# Patient Record
Sex: FEMALE | Race: OTHER | HISPANIC OR LATINO
[De-identification: names, ages, dates, MRNs, and addresses within clinical notes are randomized per-mention and may not be internally consistent; named-entity substitution may affect disease eponyms.]

---

## 2017-01-13 ENCOUNTER — NON-APPOINTMENT (OUTPATIENT)
Age: 82
End: 2017-01-13

## 2017-01-13 ENCOUNTER — APPOINTMENT (OUTPATIENT)
Dept: CARDIOLOGY | Facility: CLINIC | Age: 82
End: 2017-01-13

## 2017-01-13 VITALS
HEART RATE: 89 BPM | OXYGEN SATURATION: 98 % | WEIGHT: 164 LBS | BODY MASS INDEX: 29.06 KG/M2 | HEIGHT: 63 IN | SYSTOLIC BLOOD PRESSURE: 143 MMHG | DIASTOLIC BLOOD PRESSURE: 84 MMHG

## 2017-01-13 DIAGNOSIS — Z86.79 PERSONAL HISTORY OF OTHER DISEASES OF THE CIRCULATORY SYSTEM: ICD-10-CM

## 2017-01-18 PROBLEM — Z86.79 HISTORY OF MITRAL VALVE INSUFFICIENCY: Status: RESOLVED | Noted: 2017-01-18 | Resolved: 2017-01-18

## 2017-02-06 ENCOUNTER — APPOINTMENT (OUTPATIENT)
Dept: ELECTROPHYSIOLOGY | Facility: CLINIC | Age: 82
End: 2017-02-06

## 2017-04-05 ENCOUNTER — APPOINTMENT (OUTPATIENT)
Dept: ELECTROPHYSIOLOGY | Facility: CLINIC | Age: 82
End: 2017-04-05

## 2017-04-28 ENCOUNTER — NON-APPOINTMENT (OUTPATIENT)
Age: 82
End: 2017-04-28

## 2017-04-28 ENCOUNTER — APPOINTMENT (OUTPATIENT)
Dept: CARDIOLOGY | Facility: CLINIC | Age: 82
End: 2017-04-28

## 2017-04-28 VITALS
WEIGHT: 170 LBS | DIASTOLIC BLOOD PRESSURE: 93 MMHG | HEIGHT: 63 IN | OXYGEN SATURATION: 93 % | HEART RATE: 87 BPM | SYSTOLIC BLOOD PRESSURE: 154 MMHG | BODY MASS INDEX: 30.12 KG/M2

## 2017-04-28 DIAGNOSIS — I73.9 PERIPHERAL VASCULAR DISEASE, UNSPECIFIED: ICD-10-CM

## 2017-04-28 DIAGNOSIS — R07.9 CHEST PAIN, UNSPECIFIED: ICD-10-CM

## 2017-05-04 PROBLEM — R07.9 CHEST PAIN: Status: ACTIVE | Noted: 2017-04-28

## 2017-05-04 PROBLEM — I73.9 INTERMITTENT CLAUDICATION: Status: ACTIVE | Noted: 2017-04-28

## 2017-05-12 ENCOUNTER — OUTPATIENT (OUTPATIENT)
Dept: OUTPATIENT SERVICES | Facility: HOSPITAL | Age: 82
LOS: 1 days | End: 2017-05-12

## 2017-05-12 ENCOUNTER — APPOINTMENT (OUTPATIENT)
Dept: CV DIAGNOSTICS | Facility: HOSPITAL | Age: 82
End: 2017-05-12

## 2017-05-12 ENCOUNTER — APPOINTMENT (OUTPATIENT)
Dept: CV DIAGNOSITCS | Facility: HOSPITAL | Age: 82
End: 2017-05-12

## 2017-05-12 DIAGNOSIS — I73.9 PERIPHERAL VASCULAR DISEASE, UNSPECIFIED: ICD-10-CM

## 2017-05-12 DIAGNOSIS — R07.9 CHEST PAIN, UNSPECIFIED: ICD-10-CM

## 2017-07-21 ENCOUNTER — APPOINTMENT (OUTPATIENT)
Dept: ELECTROPHYSIOLOGY | Facility: CLINIC | Age: 82
End: 2017-07-21

## 2017-10-24 ENCOUNTER — APPOINTMENT (OUTPATIENT)
Dept: ELECTROPHYSIOLOGY | Facility: CLINIC | Age: 82
End: 2017-10-24
Payer: MEDICARE

## 2017-10-24 PROCEDURE — 93296 REM INTERROG EVL PM/IDS: CPT

## 2017-10-24 PROCEDURE — 93294 REM INTERROG EVL PM/LDLS PM: CPT

## 2018-02-14 ENCOUNTER — APPOINTMENT (OUTPATIENT)
Dept: ELECTROPHYSIOLOGY | Facility: CLINIC | Age: 83
End: 2018-02-14
Payer: MEDICARE

## 2018-02-14 PROCEDURE — 93279 PRGRMG DEV EVAL PM/LDLS PM: CPT

## 2018-02-14 RX ORDER — ALENDRONATE SODIUM 70 MG/1
70 TABLET ORAL
Refills: 0 | Status: ACTIVE | COMMUNITY

## 2018-05-08 ENCOUNTER — APPOINTMENT (OUTPATIENT)
Dept: ELECTROPHYSIOLOGY | Facility: CLINIC | Age: 83
End: 2018-05-08

## 2018-08-17 ENCOUNTER — APPOINTMENT (OUTPATIENT)
Dept: ELECTROPHYSIOLOGY | Facility: CLINIC | Age: 83
End: 2018-08-17
Payer: MEDICARE

## 2018-08-17 ENCOUNTER — NON-APPOINTMENT (OUTPATIENT)
Age: 83
End: 2018-08-17

## 2018-08-17 ENCOUNTER — APPOINTMENT (OUTPATIENT)
Dept: CARDIOLOGY | Facility: CLINIC | Age: 83
End: 2018-08-17
Payer: MEDICARE

## 2018-08-17 VITALS
RESPIRATION RATE: 14 BRPM | DIASTOLIC BLOOD PRESSURE: 90 MMHG | DIASTOLIC BLOOD PRESSURE: 85 MMHG | HEIGHT: 63 IN | RESPIRATION RATE: 16 BRPM | OXYGEN SATURATION: 96 % | BODY MASS INDEX: 30.12 KG/M2 | WEIGHT: 170 LBS | HEART RATE: 72 BPM | HEART RATE: 70 BPM | SYSTOLIC BLOOD PRESSURE: 145 MMHG | SYSTOLIC BLOOD PRESSURE: 147 MMHG

## 2018-08-17 PROCEDURE — 93280 PM DEVICE PROGR EVAL DUAL: CPT

## 2018-08-17 PROCEDURE — 99215 OFFICE O/P EST HI 40 MIN: CPT

## 2018-08-17 PROCEDURE — 93000 ELECTROCARDIOGRAM COMPLETE: CPT

## 2018-08-18 RX ORDER — CHLORHEXIDINE GLUCONATE 4 %
1000 LIQUID (ML) TOPICAL DAILY
Refills: 0 | Status: ACTIVE | COMMUNITY

## 2018-11-19 ENCOUNTER — APPOINTMENT (OUTPATIENT)
Dept: ELECTROPHYSIOLOGY | Facility: CLINIC | Age: 83
End: 2018-11-19

## 2019-02-15 ENCOUNTER — APPOINTMENT (OUTPATIENT)
Dept: CARDIOLOGY | Facility: CLINIC | Age: 84
End: 2019-02-15
Payer: MEDICARE

## 2019-02-15 ENCOUNTER — NON-APPOINTMENT (OUTPATIENT)
Age: 84
End: 2019-02-15

## 2019-02-15 VITALS
OXYGEN SATURATION: 96 % | BODY MASS INDEX: 29.77 KG/M2 | WEIGHT: 168 LBS | HEART RATE: 80 BPM | SYSTOLIC BLOOD PRESSURE: 151 MMHG | RESPIRATION RATE: 14 BRPM | DIASTOLIC BLOOD PRESSURE: 95 MMHG | HEIGHT: 63 IN

## 2019-02-15 PROCEDURE — 93000 ELECTROCARDIOGRAM COMPLETE: CPT

## 2019-02-15 PROCEDURE — 99214 OFFICE O/P EST MOD 30 MIN: CPT
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
## 2019-02-18 NOTE — DISCUSSION/SUMMARY
[FreeTextEntry1] : In summary, Ms. Melo appears to be doing reasonably well from a cardiac standpoint.  She is able to ambulate slowly and has not had any symptoms suggestive of angina or congestive heart failure.  In addition, Ms. Melo's most recent permanent pacemaker interrogation demonstrated normal device function.  However, I noted that Ms. Melo's blood pressure was again elevated at today's office visit.  Therefore, I suggested to her that we increase her amlodipine dose from 5 mg/day to 10 mg/day.  Ms. Melo is in agreement with this plan and will make the medication dosage change.  She will follow up with me in approximately three months, at which time we will obtain a follow up transthoracic echocardiogram.

## 2019-02-22 ENCOUNTER — APPOINTMENT (OUTPATIENT)
Dept: ELECTROPHYSIOLOGY | Facility: CLINIC | Age: 84
End: 2019-02-22

## 2019-05-17 ENCOUNTER — OUTPATIENT (OUTPATIENT)
Dept: OUTPATIENT SERVICES | Facility: HOSPITAL | Age: 84
LOS: 1 days | End: 2019-05-17

## 2019-05-17 ENCOUNTER — APPOINTMENT (OUTPATIENT)
Dept: CARDIOLOGY | Facility: CLINIC | Age: 84
End: 2019-05-17
Payer: MEDICARE

## 2019-05-17 ENCOUNTER — NON-APPOINTMENT (OUTPATIENT)
Age: 84
End: 2019-05-17

## 2019-05-17 ENCOUNTER — APPOINTMENT (OUTPATIENT)
Dept: CV DIAGNOSITCS | Facility: HOSPITAL | Age: 84
End: 2019-05-17
Payer: MEDICARE

## 2019-05-17 VITALS
WEIGHT: 155 LBS | OXYGEN SATURATION: 98 % | HEIGHT: 63 IN | HEART RATE: 70 BPM | SYSTOLIC BLOOD PRESSURE: 138 MMHG | BODY MASS INDEX: 27.46 KG/M2 | DIASTOLIC BLOOD PRESSURE: 80 MMHG

## 2019-05-17 DIAGNOSIS — I34.0 NONRHEUMATIC MITRAL (VALVE) INSUFFICIENCY: ICD-10-CM

## 2019-05-17 PROCEDURE — 93306 TTE W/DOPPLER COMPLETE: CPT | Mod: 26

## 2019-05-17 PROCEDURE — 99215 OFFICE O/P EST HI 40 MIN: CPT

## 2019-05-17 PROCEDURE — 93000 ELECTROCARDIOGRAM COMPLETE: CPT

## 2019-05-21 NOTE — DISCUSSION/SUMMARY
[FreeTextEntry1] : Of note is that Ms. Melo had a follow up transthoracic echocardiogram performed at the time of today's office visit.  In summary, this demonstrated normal LV and RV systolic function, with no regional wall motion abnormalities (LVEF 58%).  There was moderate mitral regurgitation.  The left atrium was found to be severely dilated.  There was evidence of moderate pulmonary hypertension, with an estimated pulmonary artery systolic pressure of 53 mmHg.  In summary, there were no significant changes compared to Ms. Melo's previous study of 11/4/2016.\par \par In summary, I am pleased with how Ms. Melo is doing from a cardiac standpoint.  She has had no recent symptoms suggestive of angina or congestive heart failure.  In addition, her blood pressure is under good control on her current medical regimen.  I discussed the findings of today's echocardiogram with Ms. Melo and indicated to her that the results were similar to those of her most recent echocardiogram.  In particular, there has been no progression in the degree of mitral regurgitation, and the LV systolic function remains normal.  I encouraged Ms. Melo to continue to ambulate as tolerated.  I also advised her to continue with her current medical regimen.  She will follow up with me in approximately three months.
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
## 2019-07-19 ENCOUNTER — APPOINTMENT (OUTPATIENT)
Dept: CARDIOLOGY | Facility: CLINIC | Age: 84
End: 2019-07-19
Payer: MEDICARE

## 2019-07-19 ENCOUNTER — NON-APPOINTMENT (OUTPATIENT)
Age: 84
End: 2019-07-19

## 2019-07-19 ENCOUNTER — INPATIENT (INPATIENT)
Facility: HOSPITAL | Age: 84
LOS: 2 days | Discharge: HOME | End: 2019-07-22
Attending: HOSPITALIST | Admitting: HOSPITALIST
Payer: MEDICARE

## 2019-07-19 VITALS
TEMPERATURE: 100 F | RESPIRATION RATE: 20 BRPM | DIASTOLIC BLOOD PRESSURE: 71 MMHG | HEART RATE: 72 BPM | SYSTOLIC BLOOD PRESSURE: 140 MMHG | OXYGEN SATURATION: 98 %

## 2019-07-19 VITALS
RESPIRATION RATE: 18 BRPM | DIASTOLIC BLOOD PRESSURE: 88 MMHG | SYSTOLIC BLOOD PRESSURE: 156 MMHG | OXYGEN SATURATION: 98 % | HEART RATE: 80 BPM | WEIGHT: 155 LBS | HEIGHT: 63 IN | BODY MASS INDEX: 27.46 KG/M2

## 2019-07-19 DIAGNOSIS — I48.91 UNSPECIFIED ATRIAL FIBRILLATION: ICD-10-CM

## 2019-07-19 DIAGNOSIS — B34.8 OTHER VIRAL INFECTIONS OF UNSPECIFIED SITE: ICD-10-CM

## 2019-07-19 DIAGNOSIS — Z00.8 ENCOUNTER FOR OTHER GENERAL EXAMINATION: ICD-10-CM

## 2019-07-19 DIAGNOSIS — E11.9 TYPE 2 DIABETES MELLITUS WITHOUT COMPLICATIONS: ICD-10-CM

## 2019-07-19 DIAGNOSIS — I25.10 ATHEROSCLEROTIC HEART DISEASE OF NATIVE CORONARY ARTERY W/OUT ANGINA PECTORIS: ICD-10-CM

## 2019-07-19 DIAGNOSIS — I44.2 ATRIOVENTRICULAR BLOCK, COMPLETE: ICD-10-CM

## 2019-07-19 DIAGNOSIS — I47.2 VENTRICULAR TACHYCARDIA: ICD-10-CM

## 2019-07-19 DIAGNOSIS — I10 ESSENTIAL (PRIMARY) HYPERTENSION: ICD-10-CM

## 2019-07-19 DIAGNOSIS — E78.5 HYPERLIPIDEMIA, UNSPECIFIED: ICD-10-CM

## 2019-07-19 DIAGNOSIS — J45.909 UNSPECIFIED ASTHMA, UNCOMPLICATED: ICD-10-CM

## 2019-07-19 DIAGNOSIS — Z29.9 ENCOUNTER FOR PROPHYLACTIC MEASURES, UNSPECIFIED: ICD-10-CM

## 2019-07-19 DIAGNOSIS — J20.8 ACUTE BRONCHITIS DUE TO OTHER SPECIFIED ORGANISMS: ICD-10-CM

## 2019-07-19 DIAGNOSIS — J18.9 PNEUMONIA, UNSPECIFIED ORGANISM: ICD-10-CM

## 2019-07-19 DIAGNOSIS — A41.9 SEPSIS, UNSPECIFIED ORGANISM: ICD-10-CM

## 2019-07-19 DIAGNOSIS — I34.0 NONRHEUMATIC MITRAL (VALVE) INSUFFICIENCY: ICD-10-CM

## 2019-07-19 LAB
ALBUMIN SERPL ELPH-MCNC: 4.3 G/DL — SIGNIFICANT CHANGE UP (ref 3.3–5)
ALP SERPL-CCNC: 51 U/L — SIGNIFICANT CHANGE UP (ref 40–120)
ALT FLD-CCNC: 16 U/L — SIGNIFICANT CHANGE UP (ref 4–33)
ANION GAP SERPL CALC-SCNC: 13 MMO/L — SIGNIFICANT CHANGE UP (ref 7–14)
APTT BLD: 23.7 SEC — LOW (ref 27.5–36.3)
AST SERPL-CCNC: 28 U/L — SIGNIFICANT CHANGE UP (ref 4–32)
B PERT DNA SPEC QL NAA+PROBE: NOT DETECTED — SIGNIFICANT CHANGE UP
BASE EXCESS BLDV CALC-SCNC: 4.2 MMOL/L — SIGNIFICANT CHANGE UP
BASOPHILS # BLD AUTO: 0.02 K/UL — SIGNIFICANT CHANGE UP (ref 0–0.2)
BASOPHILS NFR BLD AUTO: 0.6 % — SIGNIFICANT CHANGE UP (ref 0–2)
BILIRUB SERPL-MCNC: 2.3 MG/DL — HIGH (ref 0.2–1.2)
BLOOD GAS VENOUS - CREATININE: 0.77 MG/DL — SIGNIFICANT CHANGE UP (ref 0.5–1.3)
BUN SERPL-MCNC: 15 MG/DL — SIGNIFICANT CHANGE UP (ref 7–23)
C PNEUM DNA SPEC QL NAA+PROBE: NOT DETECTED — SIGNIFICANT CHANGE UP
CALCIUM SERPL-MCNC: 10.1 MG/DL — SIGNIFICANT CHANGE UP (ref 8.4–10.5)
CHLORIDE BLDV-SCNC: 101 MMOL/L — SIGNIFICANT CHANGE UP (ref 96–108)
CHLORIDE SERPL-SCNC: 98 MMOL/L — SIGNIFICANT CHANGE UP (ref 98–107)
CO2 SERPL-SCNC: 27 MMOL/L — SIGNIFICANT CHANGE UP (ref 22–31)
CREAT SERPL-MCNC: 0.75 MG/DL — SIGNIFICANT CHANGE UP (ref 0.5–1.3)
EOSINOPHIL # BLD AUTO: 0.03 K/UL — SIGNIFICANT CHANGE UP (ref 0–0.5)
EOSINOPHIL NFR BLD AUTO: 0.9 % — SIGNIFICANT CHANGE UP (ref 0–6)
FLUAV H1 2009 PAND RNA SPEC QL NAA+PROBE: NOT DETECTED — SIGNIFICANT CHANGE UP
FLUAV H1 RNA SPEC QL NAA+PROBE: NOT DETECTED — SIGNIFICANT CHANGE UP
FLUAV H3 RNA SPEC QL NAA+PROBE: NOT DETECTED — SIGNIFICANT CHANGE UP
FLUAV SUBTYP SPEC NAA+PROBE: NOT DETECTED — SIGNIFICANT CHANGE UP
FLUBV RNA SPEC QL NAA+PROBE: NOT DETECTED — SIGNIFICANT CHANGE UP
GAS PNL BLDV: 136 MMOL/L — SIGNIFICANT CHANGE UP (ref 136–146)
GLUCOSE BLDV-MCNC: 104 MG/DL — HIGH (ref 70–99)
GLUCOSE SERPL-MCNC: 105 MG/DL — HIGH (ref 70–99)
HADV DNA SPEC QL NAA+PROBE: NOT DETECTED — SIGNIFICANT CHANGE UP
HCO3 BLDV-SCNC: 26 MMOL/L — SIGNIFICANT CHANGE UP (ref 20–27)
HCOV PNL SPEC NAA+PROBE: SIGNIFICANT CHANGE UP
HCT VFR BLD CALC: 45.7 % — HIGH (ref 34.5–45)
HCT VFR BLDV CALC: 42.8 % — SIGNIFICANT CHANGE UP (ref 34.5–45)
HGB BLD-MCNC: 14.7 G/DL — SIGNIFICANT CHANGE UP (ref 11.5–15.5)
HGB BLDV-MCNC: 13.9 G/DL — SIGNIFICANT CHANGE UP (ref 11.5–15.5)
HMPV RNA SPEC QL NAA+PROBE: NOT DETECTED — SIGNIFICANT CHANGE UP
HPIV1 RNA SPEC QL NAA+PROBE: NOT DETECTED — SIGNIFICANT CHANGE UP
HPIV2 RNA SPEC QL NAA+PROBE: NOT DETECTED — SIGNIFICANT CHANGE UP
HPIV3 RNA SPEC QL NAA+PROBE: DETECTED — HIGH
HPIV4 RNA SPEC QL NAA+PROBE: NOT DETECTED — SIGNIFICANT CHANGE UP
IMM GRANULOCYTES NFR BLD AUTO: 0.6 % — SIGNIFICANT CHANGE UP (ref 0–1.5)
INR BLD: 1.16 — SIGNIFICANT CHANGE UP (ref 0.88–1.17)
LACTATE BLDV-MCNC: 1.7 MMOL/L — SIGNIFICANT CHANGE UP (ref 0.5–2)
LYMPHOCYTES # BLD AUTO: 0.77 K/UL — LOW (ref 1–3.3)
LYMPHOCYTES # BLD AUTO: 22.8 % — SIGNIFICANT CHANGE UP (ref 13–44)
MAGNESIUM SERPL-MCNC: 1.8 MG/DL — SIGNIFICANT CHANGE UP (ref 1.6–2.6)
MCHC RBC-ENTMCNC: 28.3 PG — SIGNIFICANT CHANGE UP (ref 27–34)
MCHC RBC-ENTMCNC: 32.2 % — SIGNIFICANT CHANGE UP (ref 32–36)
MCV RBC AUTO: 88.1 FL — SIGNIFICANT CHANGE UP (ref 80–100)
MONOCYTES # BLD AUTO: 0.51 K/UL — SIGNIFICANT CHANGE UP (ref 0–0.9)
MONOCYTES NFR BLD AUTO: 15.1 % — HIGH (ref 2–14)
NEUTROPHILS # BLD AUTO: 2.02 K/UL — SIGNIFICANT CHANGE UP (ref 1.8–7.4)
NEUTROPHILS NFR BLD AUTO: 60 % — SIGNIFICANT CHANGE UP (ref 43–77)
NRBC # FLD: 0 K/UL — SIGNIFICANT CHANGE UP (ref 0–0)
PCO2 BLDV: 51 MMHG — SIGNIFICANT CHANGE UP (ref 41–51)
PH BLDV: 7.38 PH — SIGNIFICANT CHANGE UP (ref 7.32–7.43)
PHOSPHATE SERPL-MCNC: 2.9 MG/DL — SIGNIFICANT CHANGE UP (ref 2.5–4.5)
PLATELET # BLD AUTO: 110 K/UL — LOW (ref 150–400)
PMV BLD: 11.7 FL — SIGNIFICANT CHANGE UP (ref 7–13)
PO2 BLDV: 18 MMHG — LOW (ref 35–40)
POTASSIUM BLDV-SCNC: 4.8 MMOL/L — HIGH (ref 3.4–4.5)
POTASSIUM SERPL-MCNC: 4 MMOL/L — SIGNIFICANT CHANGE UP (ref 3.5–5.3)
POTASSIUM SERPL-SCNC: 4 MMOL/L — SIGNIFICANT CHANGE UP (ref 3.5–5.3)
PROT SERPL-MCNC: 7.9 G/DL — SIGNIFICANT CHANGE UP (ref 6–8.3)
PROTHROM AB SERPL-ACNC: 13.3 SEC — HIGH (ref 9.8–13.1)
RBC # BLD: 5.19 M/UL — SIGNIFICANT CHANGE UP (ref 3.8–5.2)
RBC # FLD: 14.5 % — SIGNIFICANT CHANGE UP (ref 10.3–14.5)
RSV RNA SPEC QL NAA+PROBE: NOT DETECTED — SIGNIFICANT CHANGE UP
RV+EV RNA SPEC QL NAA+PROBE: NOT DETECTED — SIGNIFICANT CHANGE UP
SAO2 % BLDV: 22.2 % — LOW (ref 60–85)
SODIUM SERPL-SCNC: 138 MMOL/L — SIGNIFICANT CHANGE UP (ref 135–145)
WBC # BLD: 3.37 K/UL — LOW (ref 3.8–10.5)
WBC # FLD AUTO: 3.37 K/UL — LOW (ref 3.8–10.5)

## 2019-07-19 PROCEDURE — 71046 X-RAY EXAM CHEST 2 VIEWS: CPT | Mod: 26

## 2019-07-19 PROCEDURE — 99215 OFFICE O/P EST HI 40 MIN: CPT

## 2019-07-19 PROCEDURE — 99223 1ST HOSP IP/OBS HIGH 75: CPT | Mod: GC

## 2019-07-19 PROCEDURE — 93000 ELECTROCARDIOGRAM COMPLETE: CPT

## 2019-07-19 RX ORDER — ACETAMINOPHEN 500 MG
975 TABLET ORAL ONCE
Refills: 0 | Status: COMPLETED | OUTPATIENT
Start: 2019-07-19 | End: 2019-07-19

## 2019-07-19 RX ORDER — ENOXAPARIN SODIUM 100 MG/ML
40 INJECTION SUBCUTANEOUS DAILY
Refills: 0 | Status: DISCONTINUED | OUTPATIENT
Start: 2019-07-19 | End: 2019-07-20

## 2019-07-19 RX ORDER — WARFARIN SODIUM 2.5 MG/1
5 TABLET ORAL DAILY
Refills: 0 | Status: COMPLETED | OUTPATIENT
Start: 2019-07-19 | End: 2019-07-22

## 2019-07-19 RX ORDER — FUROSEMIDE 40 MG
40 TABLET ORAL DAILY
Refills: 0 | Status: DISCONTINUED | OUTPATIENT
Start: 2019-07-20 | End: 2019-07-22

## 2019-07-19 RX ORDER — LOSARTAN POTASSIUM 100 MG/1
25 TABLET, FILM COATED ORAL DAILY
Refills: 0 | Status: DISCONTINUED | OUTPATIENT
Start: 2019-07-20 | End: 2019-07-22

## 2019-07-19 RX ORDER — IPRATROPIUM/ALBUTEROL SULFATE 18-103MCG
3 AEROSOL WITH ADAPTER (GRAM) INHALATION ONCE
Refills: 0 | Status: COMPLETED | OUTPATIENT
Start: 2019-07-19 | End: 2019-07-19

## 2019-07-19 RX ORDER — METOPROLOL TARTRATE 50 MG
50 TABLET ORAL
Refills: 0 | Status: DISCONTINUED | OUTPATIENT
Start: 2019-07-20 | End: 2019-07-22

## 2019-07-19 RX ORDER — WARFARIN SODIUM 2.5 MG/1
6 TABLET ORAL DAILY
Refills: 0 | Status: DISCONTINUED | OUTPATIENT
Start: 2019-07-19 | End: 2019-07-19

## 2019-07-19 RX ORDER — DEXTROSE 50 % IN WATER 50 %
15 SYRINGE (ML) INTRAVENOUS ONCE
Refills: 0 | Status: DISCONTINUED | OUTPATIENT
Start: 2019-07-19 | End: 2019-07-22

## 2019-07-19 RX ORDER — ENOXAPARIN SODIUM 100 MG/ML
40 INJECTION SUBCUTANEOUS DAILY
Refills: 0 | Status: DISCONTINUED | OUTPATIENT
Start: 2019-07-19 | End: 2019-07-19

## 2019-07-19 RX ORDER — SODIUM CHLORIDE 9 MG/ML
1000 INJECTION, SOLUTION INTRAVENOUS ONCE
Refills: 0 | Status: COMPLETED | OUTPATIENT
Start: 2019-07-19 | End: 2019-07-19

## 2019-07-19 RX ORDER — INSULIN LISPRO 100/ML
VIAL (ML) SUBCUTANEOUS
Refills: 0 | Status: DISCONTINUED | OUTPATIENT
Start: 2019-07-19 | End: 2019-07-22

## 2019-07-19 RX ORDER — DEXTROSE 50 % IN WATER 50 %
25 SYRINGE (ML) INTRAVENOUS ONCE
Refills: 0 | Status: DISCONTINUED | OUTPATIENT
Start: 2019-07-19 | End: 2019-07-22

## 2019-07-19 RX ORDER — PREGABALIN 225 MG/1
1000 CAPSULE ORAL DAILY
Refills: 0 | Status: DISCONTINUED | OUTPATIENT
Start: 2019-07-19 | End: 2019-07-22

## 2019-07-19 RX ORDER — ALBUTEROL 90 UG/1
1 AEROSOL, METERED ORAL EVERY 4 HOURS
Refills: 0 | Status: DISCONTINUED | OUTPATIENT
Start: 2019-07-19 | End: 2019-07-22

## 2019-07-19 RX ORDER — PREGABALIN 225 MG/1
1 CAPSULE ORAL
Qty: 0 | Refills: 0 | DISCHARGE

## 2019-07-19 RX ORDER — IPRATROPIUM/ALBUTEROL SULFATE 18-103MCG
3 AEROSOL WITH ADAPTER (GRAM) INHALATION EVERY 6 HOURS
Refills: 0 | Status: DISCONTINUED | OUTPATIENT
Start: 2019-07-19 | End: 2019-07-22

## 2019-07-19 RX ORDER — ERGOCALCIFEROL 1.25 MG/1
1 CAPSULE ORAL
Qty: 0 | Refills: 0 | DISCHARGE

## 2019-07-19 RX ORDER — SODIUM CHLORIDE 9 MG/ML
1000 INJECTION, SOLUTION INTRAVENOUS
Refills: 0 | Status: DISCONTINUED | OUTPATIENT
Start: 2019-07-19 | End: 2019-07-22

## 2019-07-19 RX ORDER — CEFTRIAXONE 500 MG/1
1000 INJECTION, POWDER, FOR SOLUTION INTRAMUSCULAR; INTRAVENOUS ONCE
Refills: 0 | Status: COMPLETED | OUTPATIENT
Start: 2019-07-19 | End: 2019-07-19

## 2019-07-19 RX ORDER — ATORVASTATIN CALCIUM 80 MG/1
40 TABLET, FILM COATED ORAL AT BEDTIME
Refills: 0 | Status: DISCONTINUED | OUTPATIENT
Start: 2019-07-19 | End: 2019-07-22

## 2019-07-19 RX ORDER — GLUCAGON INJECTION, SOLUTION 0.5 MG/.1ML
1 INJECTION, SOLUTION SUBCUTANEOUS ONCE
Refills: 0 | Status: DISCONTINUED | OUTPATIENT
Start: 2019-07-19 | End: 2019-07-22

## 2019-07-19 RX ORDER — AMLODIPINE BESYLATE 2.5 MG/1
5 TABLET ORAL DAILY
Refills: 0 | Status: DISCONTINUED | OUTPATIENT
Start: 2019-07-20 | End: 2019-07-22

## 2019-07-19 RX ORDER — ERGOCALCIFEROL 1.25 MG/1
50000 CAPSULE ORAL
Refills: 0 | Status: DISCONTINUED | OUTPATIENT
Start: 2019-07-19 | End: 2019-07-22

## 2019-07-19 RX ORDER — DEXTROSE 50 % IN WATER 50 %
12.5 SYRINGE (ML) INTRAVENOUS ONCE
Refills: 0 | Status: DISCONTINUED | OUTPATIENT
Start: 2019-07-19 | End: 2019-07-22

## 2019-07-19 RX ORDER — TIOTROPIUM BROMIDE 18 UG/1
1 CAPSULE ORAL; RESPIRATORY (INHALATION) DAILY
Refills: 0 | Status: DISCONTINUED | OUTPATIENT
Start: 2019-07-19 | End: 2019-07-22

## 2019-07-19 RX ORDER — INSULIN LISPRO 100/ML
VIAL (ML) SUBCUTANEOUS AT BEDTIME
Refills: 0 | Status: DISCONTINUED | OUTPATIENT
Start: 2019-07-19 | End: 2019-07-22

## 2019-07-19 RX ADMIN — Medication 3 MILLILITER(S): at 14:55

## 2019-07-19 RX ADMIN — Medication 975 MILLIGRAM(S): at 14:54

## 2019-07-19 RX ADMIN — ATORVASTATIN CALCIUM 40 MILLIGRAM(S): 80 TABLET, FILM COATED ORAL at 22:41

## 2019-07-19 RX ADMIN — Medication 3 MILLILITER(S): at 23:00

## 2019-07-19 RX ADMIN — CEFTRIAXONE 100 MILLIGRAM(S): 500 INJECTION, POWDER, FOR SOLUTION INTRAMUSCULAR; INTRAVENOUS at 14:54

## 2019-07-19 RX ADMIN — SODIUM CHLORIDE 1000 MILLILITER(S): 9 INJECTION, SOLUTION INTRAVENOUS at 19:57

## 2019-07-19 RX ADMIN — Medication 110 MILLIGRAM(S): at 15:41

## 2019-07-19 RX ADMIN — WARFARIN SODIUM 5 MILLIGRAM(S): 2.5 TABLET ORAL at 22:41

## 2019-07-19 NOTE — H&P ADULT - ASSESSMENT
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

## 2019-07-19 NOTE — H&P ADULT - NSHPPHYSICALEXAM_GEN_ALL_CORE
T(C): 36.9 (07-19-19 @ 17:36), Max: 38.4 (07-19-19 @ 14:06)  HR: 74 (07-19-19 @ 17:36) (72 - 94)  BP: 126/60 (07-19-19 @ 17:36) (126/60 - 183/89)  RR: 26 (07-19-19 @ 17:36) (20 - 26)  SpO2: 96% (07-19-19 @ 17:36) (95% - 98%)    PHYSICAL EXAM:  GENERAL: Lying in bed comfortably in NAD  HEAD:  Atraumatic, Normocephalic  EYES: PERRL, EOMs intact b/l,   ENMT: Moist Mucus membranes  NERVOUS SYSTEM:  A&Ox4, Normal Motor strength in b/l Upper and lower extremities, gross sensation to light tough intact in b/l upper and lower extremities, CNs II-XII intact b/l w/out focal deficits, EOMs intact, and PERRL. Patient with very unsteady gait on ambulation with a cane and also did not appear short of breath on ambulation and was still able to speak in full sentences.   CHEST/LUNG: +slight expiratory wheezing diffusely with scattered rhonchi but no focal rales appreciated and appears to be comfortable in bed and not in respiratory distress  HEART: RRR no m/g/r, no LE edema  ABDOMEN: +BS, soft, NT, ND  EXTREMITIES:  +2 radial pulses b/l, no LE edema, +Skin changes in right lower extremity with anterior discoloration of the anterior tibia  LYMPH: No anterior/Posterior or supraclavicular LAD  SKIN: No new rashes or DTIs, warm, dry, and intact

## 2019-07-19 NOTE — H&P ADULT - PROBLEM SELECTOR PLAN 8
- Carb Consistent DASH diet    -Rogelio Bauer PGY5 EMIM Pager#[phone] - ?Previous VTE and will check PT/INR and if not therapeutic will place on Lovenox until therapeutic  - PT eval for unsteady gait  - Will likely discharge home with home PT as patient does not desire rehab even if recommended

## 2019-07-19 NOTE — ED PROVIDER NOTE - CLINICAL SUMMARY MEDICAL DECISION MAKING FREE TEXT BOX
Junky cough and crackles with fever, suspect community acquired pneumonia or viral URI, breathing comfortably on room air, no need for NIPPV or supplemental oxygen. Empiric antibiotics for CAP, cultures. No hemodynamic instability or signs of end organ damage requiring aggressive fluid resuscitation or vasopressor therapy. Requires admission due to symptoms and comorbidities.

## 2019-07-19 NOTE — H&P ADULT - PROBLEM SELECTOR PLAN 5
- FS Q6H  - Carb Consistent DASh Diet  - ISS  - Check HgbA1C in AM - FS AC and QHS  - Carb Consistent DASh Diet  - ISS  - Check HgbA1C in AM

## 2019-07-19 NOTE — H&P ADULT - ATTENDING COMMENTS
Patient was seen and examined personally by me. I have discussed the plan and reviewed the Resident's note and agree with the above physical exam findings including assessment and plan except as indicated below. Labs and imagining reviewed.     84F with PPM, Afib, HTN, T2DM presented with malaise, productive cough and SOB. Sepsis due to parainfluenza viral bronchitis with mild asthma exacerbation. clinically improved after DuoNeb in ED. agrees with treatment plan as above. supportive management, f/u cultures. h/o Afib, EKG V-paced. hold antihypertensives in setting of sepsis. daily INR, c/w home dose coumadin, titrate to INR 2-3. CHADSVASC 5, if INR subtherapeutic, would need to start AC as patient is high risk for embolic stroke. Patient was seen and examined personally by me. I have discussed the plan and reviewed the Resident's note and agree with the above physical exam findings including assessment and plan except as indicated below. Labs and imagining reviewed.     84F with PPM, Afib, HTN, T2DM presented with malaise, productive cough and SOB. Sepsis due to parainfluenza viral bronchitis with mild asthma exacerbation. clinically improved after DuoNeb in ED. agrees with treatment plan as above. supportive management, f/u cultures. h/o Afib, EKG V-paced. hold antihypertensives in setting of sepsis. daily INR, c/w home dose coumadin, titrate to INR 2-3. CHADSVASC 5, if INR subtherapeutic, would need to start AC as patient is high risk for embolic stroke. Patient also showing questionable medication compliance as she had medication refilled in feb but still with full bottle.

## 2019-07-19 NOTE — H&P ADULT - PROBLEM SELECTOR PLAN 4
- Will give Duonebs convert to MDI in one day  - Prednisone 40mg for possible mild asthma exacerbation

## 2019-07-19 NOTE — H&P ADULT - PROBLEM SELECTOR PLAN 2
- Supportive care and symptom management with prednisone and albuterol for mild reactive vs obstructive component of viral illness

## 2019-07-19 NOTE — ED PROVIDER NOTE - PMH
Asthma    AV malformation, acquired    DM (Diabetes Mellitus)    HTN (Hypertension)    Hypercholesterolemia    RA (Rheumatoid Arthritis)

## 2019-07-19 NOTE — H&P ADULT - NSHPSOCIALHISTORY_GEN_ALL_CORE
, lives with son, walks with walker, no tobacco use, occasional beer with dinner, no other illicit drug use

## 2019-07-19 NOTE — ED ADULT TRIAGE NOTE - CHIEF COMPLAINT QUOTE
Pt c/o cough, feeling tired, and SOB x5 days. +Fever.  Saw MD and was told to go to ER.  Wet cough noted

## 2019-07-19 NOTE — ED ADULT NURSE NOTE - OBJECTIVE STATEMENT
Pt received a&ox3, c/o productive cough/generalized weakness/chills x 5 days, pt appears to be in NAD, cough noted, pt ambulatory, will continue to monitor. Pt received a&ox3, c/o productive cough/generalized weakness/chills x 5 days, breathing even and unlabored, denies cp, pt denies nvd/urinary symptoms, pt appears to be in NAD, cough noted, pt ambulatory, will continue to monitor.

## 2019-07-19 NOTE — H&P ADULT - PROBLEM SELECTOR PLAN 3
- Will monitor off Abx as illness likely due to viral etiology  - Will give Duonebs convert to MDI in one day  - Prednisone 40mg for possible mild asthma exacerbation  - F/u Blood cultures  - If acutely worsens will restart Ceftriaxone and Doxycycline for PNA coverage  - Patient Euvolemic and no need for further fluid resuscitation at this time - Will monitor off Abx as illness likely due to viral etiology  - Will give Duonebs convert to MDI in one day  - Prednisone 40mg for possible mild asthma exacerbation  - F/u Blood cultures  - If acutely worsens will restart Ceftriaxone and Doxycycline for PNA coverage  - Patient Euvolemic but will give 1L bolus as no fluids given but otherwise no need for further fluid resuscitation after

## 2019-07-19 NOTE — H&P ADULT - NSICDXPASTMEDICALHX_GEN_ALL_CORE_FT
PAST MEDICAL HISTORY:  Asthma     AV malformation, acquired     DM (Diabetes Mellitus)     HTN (Hypertension)     Hypercholesterolemia     RA (Rheumatoid Arthritis)

## 2019-07-19 NOTE — ED PROVIDER NOTE - OBJECTIVE STATEMENT
88yof w/ HTN, PPM has had 5 days of cough and congestion now productive of yellow sputum, with increasing dyspnea predominantly with exertion and conversation. Reports fevers at home and generalized weakness/malaise. No sick contacts or travel.

## 2019-07-19 NOTE — H&P ADULT - HISTORY OF PRESENT ILLNESS
Pt is an 83 y/o F w/ a PMHx of HTN, hyperlipidemia, DMII, lumbar AVM (1998 embolization), AFib (2011), PPM ([employer] 1/8/15, V-paced), NSTEMI, and reported childhood asthma who p/w weakness and shortness fo breath for the past three days. Patient reports she noticed feeling weak and fatigued three days ago and was having more difficult time walking to the bathroom. Patient states that also around this time was starting to develop rhinorrhea and a cough. The shortness of breath was mostly on ambulation to the point that she was having unsteadiness on her feet and could barely make it to the bathroom. 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 Patient grew up in [place] and [facility] but moved here many years ago and no recent travel although she was preparing to move to [place] for prison in the next month. Patient states that the fatigue was affecting her preparing for her move so she came to the ER. Patient denies abdominal pain, nausea, vomiting, recent antibiotic use, diarrhea, dysuria, urinary frequency, new rashes or injuries, headaches, neck stiffness, photophobia, and sick contacts. Patient denies exertional dyspnea and chest pain/discomfort, paroxysmal nocturnal dyspnea, orthopnea (although she states that she uses two pillows to prop herself up normally for comfort reasons only and not because she is short of breath), and new or worsening edema in lower and upper extremities.     In the ER the patient was febrile to 101F, HR 94, not hypotensive, tachypneic to 20-24 95-98% RA found to have WBC of 3.3K, lactate 1.7, Normal CXR, Tylenol and given ceftriaxone and doxycycline and was admit to medicine.

## 2019-07-19 NOTE — ED ADULT NURSE NOTE - NSIMPLEMENTINTERV_GEN_ALL_ED
Implemented All Fall Risk Interventions:  [place] to call system. Call bell, personal items and telephone within reach. Instruct patient to call for assistance. Room bathroom lighting operational. Non-slip footwear when patient is off stretcher. Physically safe environment: no spills, clutter or unnecessary equipment. Stretcher in lowest position, wheels locked, appropriate side rails in place. Provide visual cue, wrist band, yellow gown, etc. Monitor gait and stability. Monitor for mental status changes and reorient to person, place, and time. Review medications for side effects contributing to fall risk. Reinforce activity limits and safety measures with patient and family.

## 2019-07-19 NOTE — ED PROVIDER NOTE - ATTENDING CONTRIBUTION TO CARE
Attending note:   After face to face evaluation of this patient, I concur with above noted hx, pe, and care plan for this patient.  87 y/o F with c/o cough, +phlegm, coryza for 5 days .  +Wheeze on exam.    Evaluation in progress

## 2019-07-19 NOTE — H&P ADULT - PROBLEM SELECTOR PLAN 6
- Check PT/INR now  - Dose coumadin based on INR  - Currently rate controlled  - Will restart BB tomorrow if BP stable

## 2019-07-19 NOTE — H&P ADULT - PROBLEM SELECTOR PLAN 1
- Paraflu Positive on RVP likely source  - Will monitor off Abx as illness likely due to viral etiology  - Will give Duonebs convert to MDI in one day  - Prednisone 40mg for possible mild asthma exacerbation  - F/u Blood cultures  - If acutely worsens will restart Ceftriaxone and Doxycycline for PNA coverage

## 2019-07-19 NOTE — H&P ADULT - PROBLEM SELECTOR PLAN 7
- ?Previous VTE and will place on Lovenox for DVT PPx  - PT eval for unsteady gait  - Will likely discharge home with home PT as patient does not desire rehab even if recommended - Will hold BP meds till tomorrow and if BP stable and normal will restart (ordered to restart tomorrow but not to give today)

## 2019-07-19 NOTE — H&P ADULT - NSHPLABSRESULTS_GEN_ALL_CORE
14.7   3.37  )-----------( 110      ( 19 Jul 2019 14:31 )             45.7     07-19    138  |  98  |  15  ----------------------------<  105<H>  4.0   |  27  |  0.75    Ca    10.1      19 Jul 2019 14:31  Phos  2.9     07-19  Mg     1.8     07-19    TPro  7.9  /  Alb  4.3  /  TBili  2.3<H>  /  DBili  x   /  AST  28  /  ALT  16  /  AlkPhos  51  07-19    LIVER FUNCTIONS - ( 19 Jul 2019 14:31 )  Alb: 4.3 g/dL / Pro: 7.9 g/dL / ALK PHOS: 51 u/L / ALT: 16 u/L / AST: 28 u/L / GGT: x     / T. Bili 2.3 mg/dL / D. Bili x         RADIOLOGY & ADDITIONAL TESTS:  Chest Xray -   Clear lungs. No pleural effusions or pneumothorax. Stable left chest wall single-lead pacemaker, cardiac, aortic calcifications, tortuous descending thoracic aorta contour. Trachea midline. Generalized osteopenia. Mid thoracic level degenerative change. Paralumbar region IVC filter apparent at the inferior image margins.      Imaging Personally Reviewed:  [x] YES  [ ] NO    Consultant(s) Notes Reviewed:  - None

## 2019-07-20 LAB
ALBUMIN SERPL ELPH-MCNC: 3.5 G/DL — SIGNIFICANT CHANGE UP (ref 3.3–5)
ALP SERPL-CCNC: 45 U/L — SIGNIFICANT CHANGE UP (ref 40–120)
ALT FLD-CCNC: 13 U/L — SIGNIFICANT CHANGE UP (ref 4–33)
ANION GAP SERPL CALC-SCNC: 13 MMO/L — SIGNIFICANT CHANGE UP (ref 7–14)
ANISOCYTOSIS BLD QL: SLIGHT — SIGNIFICANT CHANGE UP
APTT BLD: 31.9 SEC — SIGNIFICANT CHANGE UP (ref 27.5–36.3)
AST SERPL-CCNC: 25 U/L — SIGNIFICANT CHANGE UP (ref 4–32)
BASOPHILS # BLD AUTO: 0.01 K/UL — SIGNIFICANT CHANGE UP (ref 0–0.2)
BASOPHILS NFR BLD AUTO: 0.4 % — SIGNIFICANT CHANGE UP (ref 0–2)
BASOPHILS NFR SPEC: 0 % — SIGNIFICANT CHANGE UP (ref 0–2)
BILIRUB SERPL-MCNC: 1.5 MG/DL — HIGH (ref 0.2–1.2)
BLASTS # FLD: 0 % — SIGNIFICANT CHANGE UP (ref 0–0)
BUN SERPL-MCNC: 14 MG/DL — SIGNIFICANT CHANGE UP (ref 7–23)
CALCIUM SERPL-MCNC: 9 MG/DL — SIGNIFICANT CHANGE UP (ref 8.4–10.5)
CHLORIDE SERPL-SCNC: 100 MMOL/L — SIGNIFICANT CHANGE UP (ref 98–107)
CO2 SERPL-SCNC: 24 MMOL/L — SIGNIFICANT CHANGE UP (ref 22–31)
CREAT SERPL-MCNC: 0.62 MG/DL — SIGNIFICANT CHANGE UP (ref 0.5–1.3)
EOSINOPHIL # BLD AUTO: 0.01 K/UL — SIGNIFICANT CHANGE UP (ref 0–0.5)
EOSINOPHIL NFR BLD AUTO: 0.4 % — SIGNIFICANT CHANGE UP (ref 0–6)
EOSINOPHIL NFR FLD: 0 % — SIGNIFICANT CHANGE UP (ref 0–6)
GIANT PLATELETS BLD QL SMEAR: PRESENT — SIGNIFICANT CHANGE UP
GLUCOSE SERPL-MCNC: 141 MG/DL — HIGH (ref 70–99)
HBA1C BLD-MCNC: 6 % — HIGH (ref 4–5.6)
HCT VFR BLD CALC: 43 % — SIGNIFICANT CHANGE UP (ref 34.5–45)
HGB BLD-MCNC: 13.6 G/DL — SIGNIFICANT CHANGE UP (ref 11.5–15.5)
IMM GRANULOCYTES NFR BLD AUTO: 0.7 % — SIGNIFICANT CHANGE UP (ref 0–1.5)
INR BLD: 1.26 — HIGH (ref 0.88–1.17)
LYMPHOCYTES # BLD AUTO: 0.64 K/UL — LOW (ref 1–3.3)
LYMPHOCYTES # BLD AUTO: 23.6 % — SIGNIFICANT CHANGE UP (ref 13–44)
LYMPHOCYTES NFR SPEC AUTO: 7.1 % — LOW (ref 13–44)
MACROCYTES BLD QL: SLIGHT — SIGNIFICANT CHANGE UP
MAGNESIUM SERPL-MCNC: 1.6 MG/DL — SIGNIFICANT CHANGE UP (ref 1.6–2.6)
MCHC RBC-ENTMCNC: 28.2 PG — SIGNIFICANT CHANGE UP (ref 27–34)
MCHC RBC-ENTMCNC: 31.6 % — LOW (ref 32–36)
MCV RBC AUTO: 89 FL — SIGNIFICANT CHANGE UP (ref 80–100)
METAMYELOCYTES # FLD: 0 % — SIGNIFICANT CHANGE UP (ref 0–1)
MONOCYTES # BLD AUTO: 0.16 K/UL — SIGNIFICANT CHANGE UP (ref 0–0.9)
MONOCYTES NFR BLD AUTO: 5.9 % — SIGNIFICANT CHANGE UP (ref 2–14)
MONOCYTES NFR BLD: 4.4 % — SIGNIFICANT CHANGE UP (ref 2–9)
MYELOCYTES NFR BLD: 0 % — SIGNIFICANT CHANGE UP (ref 0–0)
NEUTROPHIL AB SER-ACNC: 81.4 % — HIGH (ref 43–77)
NEUTROPHILS # BLD AUTO: 1.87 K/UL — SIGNIFICANT CHANGE UP (ref 1.8–7.4)
NEUTROPHILS NFR BLD AUTO: 69 % — SIGNIFICANT CHANGE UP (ref 43–77)
NEUTS BAND # BLD: 2.7 % — SIGNIFICANT CHANGE UP (ref 0–6)
NRBC # FLD: 0.02 K/UL — SIGNIFICANT CHANGE UP (ref 0–0)
OTHER - HEMATOLOGY %: 0 — SIGNIFICANT CHANGE UP
PLATELET # BLD AUTO: 88 K/UL — LOW (ref 150–400)
PLATELET COUNT - ESTIMATE: SIGNIFICANT CHANGE UP
PMV BLD: 11.8 FL — SIGNIFICANT CHANGE UP (ref 7–13)
POIKILOCYTOSIS BLD QL AUTO: SIGNIFICANT CHANGE UP
POTASSIUM SERPL-MCNC: 3.7 MMOL/L — SIGNIFICANT CHANGE UP (ref 3.5–5.3)
POTASSIUM SERPL-SCNC: 3.7 MMOL/L — SIGNIFICANT CHANGE UP (ref 3.5–5.3)
PROMYELOCYTES # FLD: 0 % — SIGNIFICANT CHANGE UP (ref 0–0)
PROT SERPL-MCNC: 6.7 G/DL — SIGNIFICANT CHANGE UP (ref 6–8.3)
PROTHROM AB SERPL-ACNC: 14.1 SEC — HIGH (ref 9.8–13.1)
RBC # BLD: 4.83 M/UL — SIGNIFICANT CHANGE UP (ref 3.8–5.2)
RBC # FLD: 14.2 % — SIGNIFICANT CHANGE UP (ref 10.3–14.5)
SMUDGE CELLS # BLD: PRESENT — SIGNIFICANT CHANGE UP
SODIUM SERPL-SCNC: 137 MMOL/L — SIGNIFICANT CHANGE UP (ref 135–145)
SPECIMEN SOURCE: SIGNIFICANT CHANGE UP
SPECIMEN SOURCE: SIGNIFICANT CHANGE UP
VARIANT LYMPHS # BLD: 4.4 % — SIGNIFICANT CHANGE UP
WBC # BLD: 2.71 K/UL — LOW (ref 3.8–10.5)
WBC # FLD AUTO: 2.71 K/UL — LOW (ref 3.8–10.5)

## 2019-07-20 PROCEDURE — 99233 SBSQ HOSP IP/OBS HIGH 50: CPT | Mod: GC

## 2019-07-20 RX ORDER — ENOXAPARIN SODIUM 100 MG/ML
70 INJECTION SUBCUTANEOUS
Refills: 0 | Status: DISCONTINUED | OUTPATIENT
Start: 2019-07-20 | End: 2019-07-22

## 2019-07-20 RX ORDER — ENOXAPARIN SODIUM 100 MG/ML
70 INJECTION SUBCUTANEOUS
Refills: 0 | Status: DISCONTINUED | OUTPATIENT
Start: 2019-07-20 | End: 2019-07-20

## 2019-07-20 RX ORDER — ENOXAPARIN SODIUM 100 MG/ML
70 INJECTION SUBCUTANEOUS DAILY
Refills: 0 | Status: DISCONTINUED | OUTPATIENT
Start: 2019-07-20 | End: 2019-07-20

## 2019-07-20 RX ADMIN — LOSARTAN POTASSIUM 25 MILLIGRAM(S): 100 TABLET, FILM COATED ORAL at 05:02

## 2019-07-20 RX ADMIN — Medication 3 MILLILITER(S): at 15:32

## 2019-07-20 RX ADMIN — Medication 3 MILLILITER(S): at 22:30

## 2019-07-20 RX ADMIN — Medication 50 MILLIGRAM(S): at 17:44

## 2019-07-20 RX ADMIN — ENOXAPARIN SODIUM 70 MILLIGRAM(S): 100 INJECTION SUBCUTANEOUS at 17:44

## 2019-07-20 RX ADMIN — Medication 3 MILLILITER(S): at 09:50

## 2019-07-20 RX ADMIN — Medication 1: at 08:49

## 2019-07-20 RX ADMIN — AMLODIPINE BESYLATE 5 MILLIGRAM(S): 2.5 TABLET ORAL at 05:02

## 2019-07-20 RX ADMIN — Medication 40 MILLIGRAM(S): at 01:05

## 2019-07-20 RX ADMIN — Medication 3 MILLILITER(S): at 04:19

## 2019-07-20 RX ADMIN — Medication 40 MILLIGRAM(S): at 05:02

## 2019-07-20 RX ADMIN — ATORVASTATIN CALCIUM 40 MILLIGRAM(S): 80 TABLET, FILM COATED ORAL at 22:08

## 2019-07-20 RX ADMIN — WARFARIN SODIUM 5 MILLIGRAM(S): 2.5 TABLET ORAL at 17:43

## 2019-07-20 RX ADMIN — PREGABALIN 1000 MICROGRAM(S): 225 CAPSULE ORAL at 17:43

## 2019-07-20 RX ADMIN — Medication 1: at 12:37

## 2019-07-20 RX ADMIN — Medication 50 MILLIGRAM(S): at 05:02

## 2019-07-20 NOTE — PROGRESS NOTE ADULT - PROBLEM SELECTOR PLAN 6
BP is stable, restart home meds:  - amlodipine 5mg daily, furosemide 40mg daily, losartan 25mg daily, metropolol 50mg BID BP controlled  - c/w amlodipine 5mg, furosemide 40mg, losartan 25mg, metropolol 50mg BID

## 2019-07-20 NOTE — CHART NOTE - NSCHARTNOTEFT_GEN_A_CORE
Notified that pt was having difficulty breathing and desating to 88% on RA    I interviewed and examined the pt at 00:35 7/20.     Pt says she has these moments Notified that pt was having difficulty breathing and desating to 88% on RA    I interviewed and examined the pt at 00:35 7/20.     Pt said she has moments where she has trouble breathing, which wakes her from her sleep.  She says if she is able to cough and clear her airways, she feels better.  Cough is productive of whitish, non-bloody sputum.  I asked her to please save her sputum next time she has to cough.  Endorses shortness of breath.  Endorses chest pain only when she coughs.      Vital Signs Last 24 Hrs  T(C): 36.9 (19 Jul 2019 22:00), Max: 38.4 (19 Jul 2019 14:06)  T(F): 98.5 (19 Jul 2019 22:00), Max: 101.2 (19 Jul 2019 14:06)  HR: 68 (19 Jul 2019 23:00) (65 - 94)  BP: 171/72 (19 Jul 2019 22:00) (126/60 - 183/89)  BP(mean): 122 (19 Jul 2019 14:06) (122 - 122)  RR: 17 (19 Jul 2019 22:00) (17 - 26)  SpO2: 98% (19 Jul 2019 23:00) (95% - 98%)    Pt was laying on her side because it made breathing easier.  On exam, there were rales in all lung fields, worse on pt's left.  In the upper lung fields bilat, there was wheezing.  She was tachypneic to 30 bpm.  Breaths were shallow, but she was not using her accessory muscles.  Heart sounds were difficult to auscultate due to lung sounds from ventilation, but I was able to hear RRR, S1, S2.      A/P  Mildly worsening respiratory function in the setting of COPD exacerbation.  Pt is parainfluenza positive.  She had just gotten a duoneb treatment, which she said was helpful but which seems to wear off before the next.  Her O2 saturation on RA was 88%; on 2L NC was 92%.  I will leave her on 2L NC to help her sleep, which is important to her recovery.  Per chart review, and confirmed by floor nursing, she has not received her PO steroids yet.  That should be given now, and pt's respiratory function rechecked after administration.     Michael Norton M.D.   [facility] Night Float Teams 1, 2, 3  Pager: [phone] / [phone]  After 7 AM, or Saturday, please page primary team Notified that pt was having difficulty breathing and desating to 88% on RA    I interviewed and examined the pt at 00:35 7/20.     Pt said she has moments where she has trouble breathing, which wakes her from her sleep.  She says if she is able to cough and clear her airways, she feels better.  Cough is productive of whitish, non-bloody sputum.  I asked her to please save her sputum next time she has to cough.  Endorses shortness of breath.  Endorses chest pain only when she coughs.      Vital Signs Last 24 Hrs  T(C): 36.9 (19 Jul 2019 22:00), Max: 38.4 (19 Jul 2019 14:06)  T(F): 98.5 (19 Jul 2019 22:00), Max: 101.2 (19 Jul 2019 14:06)  HR: 68 (19 Jul 2019 23:00) (65 - 94)  BP: 171/72 (19 Jul 2019 22:00) (126/60 - 183/89)  BP(mean): 122 (19 Jul 2019 14:06) (122 - 122)  RR: 17 (19 Jul 2019 22:00) (17 - 26)  SpO2: 98% (19 Jul 2019 23:00) (95% - 98%)    Pt was laying on her side because it made breathing easier.  On exam, there were rales in all lung fields, worse on pt's left.  In the upper lung fields bilat, there was wheezing.  She was tachypneic to 30 bpm.  Breaths were shallow, but she was not using her accessory muscles.  Heart sounds were difficult to auscultate due to lung sounds from ventilation, but I was able to hear RRR, S1, S2.      A/P  Mildly worsening respiratory function in the setting of COPD exacerbation.  Pt is parainfluenza positive.  Afebrile.  She had just gotten a duoneb treatment, which she said was helpful but which seems to wear off before the next.  Her O2 saturation on RA was 88%; on 2L NC was 92%.  I will leave her on 2L NC to help her sleep, which is important to her recovery.  Per chart review, and confirmed by floor nursing, she has not received her PO steroids yet.  That should be given now, and pt's respiratory function rechecked after administration.     Michael Norton M.D.   [facility] Night Float Teams 1, 2, 3  Pager: [phone] / [phone]  After 7 AM, or Saturday, please page primary team Notified that pt was having difficulty breathing and desating to 88% on RA    I interviewed and examined the pt at 00:35 7/20.     Pt said she has moments where she has trouble breathing, which wakes her from her sleep.  She says if she is able to cough and clear her airways, she feels better.  Cough is productive of whitish, non-bloody sputum.  I asked her to please save her sputum next time she has to cough.  Endorses shortness of breath.  Endorses chest pain only when she coughs.      Vital Signs Last 24 Hrs  T(C): 36.9 (19 Jul 2019 22:00), Max: 38.4 (19 Jul 2019 14:06)  T(F): 98.5 (19 Jul 2019 22:00), Max: 101.2 (19 Jul 2019 14:06)  HR: 68 (19 Jul 2019 23:00) (65 - 94)  BP: 171/72 (19 Jul 2019 22:00) (126/60 - 183/89)  BP(mean): 122 (19 Jul 2019 14:06) (122 - 122)  RR: 17 (19 Jul 2019 22:00) (17 - 26)  SpO2: 98% (19 Jul 2019 23:00) (95% - 98%)    Pt was laying on her side because it made breathing easier.  On exam, there were rales in all lung fields, worse on pt's left.  In the upper lung fields bilat, there was wheezing.  No crackles.  She was tachypneic to 30 bpm.  Breaths were shallow, but she was not using her accessory muscles.  Heart sounds were difficult to auscultate due to lung sounds from ventilation, but I was able to hear RRR, S1, S2.      A/P  Mildly worsening respiratory function in the setting of asthma exacerbation.  Pt is parainfluenza positive.  Afebrile.  She had just gotten a duoneb treatment, which she said was helpful but which seems to wear off before the next.  Her O2 saturation on RA was 88%; on 2L NC was 92%.  I will leave her on 2L NC to help her sleep, which is important to her recovery.  Per chart review, and confirmed by floor nursing, she has not received her PO steroids yet.  That should be given now, and pt's respiratory function rechecked after administration.  If she is truly worsening after treatment, I will start Ceftriaxone and Doxycycline per primary team's plan.    Michael Norton M.D.   [facility] Night Float Teams 1, 2, 3  Pager: [phone] / [phone]  After 7 AM, or Saturday, please page primary team

## 2019-07-20 NOTE — PROGRESS NOTE ADULT - ASSESSMENT
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 Now with resolved SOB and improved cough on duo nebs and  steroids. 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

## 2019-07-20 NOTE — PROGRESS NOTE ADULT - PROBLEM SELECTOR PLAN 5
- INR 1.26  - increase home warfarin dosage  - Currently rate controlled  - restart metropolol 50mg BID INR 1.26; currently rate-controlled  - increase home warfarin dosage  - c/w metropolol 50mg BID

## 2019-07-20 NOTE — PROVIDER CONTACT NOTE (OTHER) - ACTION/TREATMENT ORDERED:
MD notified. MD to bedside. Lung sounds assessed, patient repositioned, 2 L Nasal canula placed for comfort, and medication administered as per MD order

## 2019-07-20 NOTE — PHYSICAL THERAPY INITIAL EVALUATION ADULT - PERTINENT HX OF CURRENT PROBLEM, REHAB EVAL
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

## 2019-07-20 NOTE — PROGRESS NOTE ADULT - PROBLEM SELECTOR PLAN 7
- ?Previous VTE, INR 1.26. Start Lovenox until therapeutic INR  - PT eval for unsteady gait  - Will likely discharge home with home PT as patient does not desire rehab even if recommended DVT: Lovenox to Coumadin bridge

## 2019-07-20 NOTE — PROGRESS NOTE ADULT - PROBLEM SELECTOR PLAN 1
- +Parafluenza 3 on RVP   - cont with duo nebs and convert to MDI  - c/w prednisone 40mg daily for +/- asthma attack  - Will monitor off Abx as illness likely due to viral etiology  - F/u Blood cultures  - If acutely worsens, consider bacterial infection and restart Ceftriaxone and Doxycycline for CAP coverage This AM afebrile, hemodynamically stable and with improved cough and resolved SOB. Saturating at 98% on RA.  - +Parafluenza 3 on RVP   - cont with duo nebs and convert to MDI  - c/w prednisone 40mg daily for +/- asthma attack  - Will monitor off Abx as illness likely due to viral etiology  - F/u Blood cultures  - If acutely worsens, consider bacterial infection and restart Ceftriaxone and Doxycycline for CAP coverage febrile, hemodynamically stable and with improved cough and resolved SOB. Saturating at 98% on RA with noted positive Parafluenza 3 on RVP   - cont with duo nebs and convert to MDI  - c/w prednisone 40mg daily  - Will monitor off Abx as illness likely due to viral etiology  - F/u Blood cultures  - If acutely worsens, consider bacterial infection and restart Ceftriaxone and Doxycycline for CAP coverage

## 2019-07-20 NOTE — PROGRESS NOTE ADULT - SUBJECTIVE AND OBJECTIVE BOX
Kiki Husain PGY1  Pager: [phone]      Chief Complaint: SOB and cough x  3 days        Subjective: Patient was desaturating to 85-88% on RA last night. She was put on 2l of NC and given steroids. O2sat improved to 95% on RA. This AM she was saturating to 98% on RA, speaking comfortably in full sentences. reports feeling a lot better and improved cough. Good appetite. She denies any sob, chest pain, headaches, dizziness, abd pain, n/v, or diarrhea.         VITAL SIGNS:  Vital Signs Last 24 Hrs  T(C): 37.1 (20 Jul 2019 05:00), Max: 38.4 (19 Jul 2019 14:06)  T(F): 98.8 (20 Jul 2019 05:00), Max: 101.2 (19 Jul 2019 14:06)  HR: 60 (20 Jul 2019 05:00) (60 - 94)  BP: 157/76 (20 Jul 2019 05:00) (126/60 - 183/89)  BP(mean): 122 (19 Jul 2019 14:06) (122 - 122)  RR: 18 (20 Jul 2019 05:00) (17 - 26)  SpO2: 95% (20 Jul 2019 05:00) (95% - 98%)      PHYSICAL EXAM:     GENERAL: elderly woman wearing hospital gown, laying comfortably in bed in no acute distress  HEENT: PERRLA, EOMI, moist oropharynx   RESPIRATORY: diffused rhonchi in b/l lungs. no rales or wheezing was appreciated   CARDIOVASCULAR: RRR, no murmurs, gallops, rubs  ABDOMINAL: soft, non-tender, non-distended, positive bowel sounds   EXTREMITIES: no clubbing, cyanosis, or edema  NEUROLOGICAL: alert and oriented x 3, non-focal  SKIN: no rashes. +Hyperpigmented and dry skin in the RLE.   MUSCULOSKELETAL: no gross joint deformity                          13.6   2.71  )-----------( 88       ( 20 Jul 2019 06:00 )             43.0     07-20    137  |  100  |  14  ----------------------------<  141<H>  3.7   |  24  |  0.62    Ca    9.0      20 Jul 2019 06:00  Phos  2.9     07-19  Mg     1.6     07-20    TPro  6.7  /  Alb  3.5  /  TBili  1.5<H>  /  DBili  x   /  AST  25  /  ALT  13  /  AlkPhos  45  07-20      CAPILLARY BLOOD GLUCOSE      POCT Blood Glucose.: 171 mg/dL (20 Jul 2019 08:17)  POCT Blood Glucose.: 115 mg/dL (19 Jul 2019 22:14)  POCT Blood Glucose.: 92 mg/dL (19 Jul 2019 19:32)      MEDICATIONS  (STANDING):  ALBUTerol    90 MICROgram(s) HFA Inhaler 1 Puff(s) Inhalation every 4 hours  ALBUTerol/ipratropium for Nebulization 3 milliLiter(s) Nebulizer every 6 hours  amLODIPine   Tablet 5 milliGRAM(s) Oral daily  atorvastatin 40 milliGRAM(s) Oral at bedtime  cyanocobalamin 1000 MICROGram(s) Oral daily  dextrose 5%. 1000 milliLiter(s) (50 mL/Hr) IV Continuous <Continuous>  dextrose 50% Injectable 12.5 Gram(s) IV Push once  dextrose 50% Injectable 25 Gram(s) IV Push once  dextrose 50% Injectable 25 Gram(s) IV Push once  enoxaparin Injectable 40 milliGRAM(s) SubCutaneous daily  ergocalciferol 88521 Unit(s) Oral every week  furosemide    Tablet 40 milliGRAM(s) Oral daily  insulin lispro (HumaLOG) corrective regimen sliding scale   SubCutaneous three times a day before meals  insulin lispro (HumaLOG) corrective regimen sliding scale   SubCutaneous at bedtime  losartan 25 milliGRAM(s) Oral daily  metoprolol tartrate 50 milliGRAM(s) Oral two times a day  predniSONE   Tablet 40 milliGRAM(s) Oral daily  tiotropium 18 MICROgram(s) Capsule 1 Capsule(s) Inhalation daily  warfarin 5 milliGRAM(s) Oral daily    MEDICATIONS  (PRN):  dextrose 40% Gel 15 Gram(s) Oral once PRN Blood Glucose LESS THAN 70 milliGRAM(s)/deciliter  glucagon  Injectable 1 milliGRAM(s) IntraMuscular once PRN Glucose LESS THAN 70 milligrams/deciliter

## 2019-07-20 NOTE — PROGRESS NOTE ADULT - PROBLEM SELECTOR PLAN 4
- A1c: 6.0, BG level in low to mid 100s  - FS and ISS  - Carb Consistent DASh Diet A1c: 6.0, BG level in low to mid 100s  - FS and ISS  - Carb Consistent DASh Diet

## 2019-07-20 NOTE — PROGRESS NOTE ADULT - PROBLEM SELECTOR PLAN 2
- Will monitor off Abx as illness likely due to viral etiology  - Will give Duonebs convert to MDI in one day  - Prednisone 40mg for possible mild asthma exacerbation  - F/u Blood cultures  - If acutely worsens will restart Ceftriaxone and Doxycycline for CAP coverage Continues to be afebrile and hemodynamically stable.   - Will monitor off Abx as illness likely due to viral etiology  - Will give Duonebs convert to MDI in one day  - Prednisone 40mg for possible mild asthma exacerbation  - F/u Blood cultures  - If acutely worsens will restart Ceftriaxone and Doxycycline for CAP coverage Secondary to parainfluenza virus  - Will monitor off Abx as illness likely due to viral etiology  - Will give Duonebs convert to MDI in one day  - Prednisone 40mg for possible mild asthma exacerbation  - F/u Blood cultures  - If acutely worsens will restart Ceftriaxone and Doxycycline for CAP coverage

## 2019-07-21 PROCEDURE — 99233 SBSQ HOSP IP/OBS HIGH 50: CPT | Mod: GC

## 2019-07-21 RX ORDER — APIXABAN 2.5 MG/1
2 TABLET, FILM COATED ORAL
Qty: 30 | Refills: 0
Start: 2019-07-21

## 2019-07-21 RX ADMIN — Medication 40 MILLIGRAM(S): at 05:56

## 2019-07-21 RX ADMIN — Medication 3 MILLILITER(S): at 22:36

## 2019-07-21 RX ADMIN — AMLODIPINE BESYLATE 5 MILLIGRAM(S): 2.5 TABLET ORAL at 05:55

## 2019-07-21 RX ADMIN — PREGABALIN 1000 MICROGRAM(S): 225 CAPSULE ORAL at 17:37

## 2019-07-21 RX ADMIN — Medication 50 MILLIGRAM(S): at 05:55

## 2019-07-21 RX ADMIN — ATORVASTATIN CALCIUM 40 MILLIGRAM(S): 80 TABLET, FILM COATED ORAL at 22:51

## 2019-07-21 RX ADMIN — Medication 50 MILLIGRAM(S): at 17:37

## 2019-07-21 RX ADMIN — ENOXAPARIN SODIUM 70 MILLIGRAM(S): 100 INJECTION SUBCUTANEOUS at 05:54

## 2019-07-21 RX ADMIN — Medication 3 MILLILITER(S): at 15:31

## 2019-07-21 RX ADMIN — LOSARTAN POTASSIUM 25 MILLIGRAM(S): 100 TABLET, FILM COATED ORAL at 05:55

## 2019-07-21 RX ADMIN — ENOXAPARIN SODIUM 70 MILLIGRAM(S): 100 INJECTION SUBCUTANEOUS at 17:37

## 2019-07-21 RX ADMIN — Medication 1: at 17:37

## 2019-07-21 RX ADMIN — WARFARIN SODIUM 5 MILLIGRAM(S): 2.5 TABLET ORAL at 17:38

## 2019-07-21 RX ADMIN — Medication 3 MILLILITER(S): at 09:13

## 2019-07-21 NOTE — PROGRESS NOTE ADULT - SUBJECTIVE AND OBJECTIVE BOX
Odell Goddard MD  Beeper: [phone] ([facility])/ [phone] ([facility])     Subjective:    Patient is a 88y old  Female who presents with a chief complaint of Shortness of breath and cough r/o Sepsis (20 Jul 2019 09:42)      Overnight events:    MEDICATIONS  (STANDING):  ALBUTerol    90 MICROgram(s) HFA Inhaler 1 Puff(s) Inhalation every 4 hours  ALBUTerol/ipratropium for Nebulization 3 milliLiter(s) Nebulizer every 6 hours  amLODIPine   Tablet 5 milliGRAM(s) Oral daily  atorvastatin 40 milliGRAM(s) Oral at bedtime  cyanocobalamin 1000 MICROGram(s) Oral daily  dextrose 5%. 1000 milliLiter(s) (50 mL/Hr) IV Continuous <Continuous>  dextrose 50% Injectable 12.5 Gram(s) IV Push once  dextrose 50% Injectable 25 Gram(s) IV Push once  dextrose 50% Injectable 25 Gram(s) IV Push once  enoxaparin Injectable 70 milliGRAM(s) SubCutaneous two times a day  ergocalciferol 18835 Unit(s) Oral every week  furosemide    Tablet 40 milliGRAM(s) Oral daily  insulin lispro (HumaLOG) corrective regimen sliding scale   SubCutaneous three times a day before meals  insulin lispro (HumaLOG) corrective regimen sliding scale   SubCutaneous at bedtime  losartan 25 milliGRAM(s) Oral daily  metoprolol tartrate 50 milliGRAM(s) Oral two times a day  predniSONE   Tablet 40 milliGRAM(s) Oral daily  tiotropium 18 MICROgram(s) Capsule 1 Capsule(s) Inhalation daily  warfarin 5 milliGRAM(s) Oral daily    MEDICATIONS  (PRN):  dextrose 40% Gel 15 Gram(s) Oral once PRN Blood Glucose LESS THAN 70 milliGRAM(s)/deciliter  glucagon  Injectable 1 milliGRAM(s) IntraMuscular once PRN Glucose LESS THAN 70 milligrams/deciliter      Objective:    Vitals: Vital Signs Last 24 Hrs  T(C): 36.6 (07-21-19 @ 05:53), Max: 36.7 (07-20-19 @ 21:11)  T(F): 97.8 (07-21-19 @ 05:53), Max: 98.1 (07-20-19 @ 21:11)  HR: 86 (07-21-19 @ 05:53) (60 - 86)  BP: 121/86 (07-21-19 @ 05:53) (121/86 - 136/78)  BP(mean): --  RR: 18 (07-21-19 @ 05:53) (17 - 18)  SpO2: 95% (07-21-19 @ 05:53) (95% - 98%)              I&O's Summary      PHYSICAL EXAM:  GENERAL: elderly woman wearing hospital gown, laying comfortably in bed in no acute distress  HEENT: PERRLA, EOMI, moist oropharynx   RESPIRATORY: diffused rhonchi in b/l lungs. no rales or wheezing was appreciated   CARDIOVASCULAR: RRR, no murmurs, gallops, rubs  ABDOMINAL: soft, non-tender, non-distended, positive bowel sounds   EXTREMITIES: no clubbing, cyanosis, or edema  NEUROLOGICAL: alert and oriented x 3, non-focal  SKIN: no rashes. +Hyperpigmented and dry skin in the RLE.   MUSCULOSKELETAL: no gross joint deformity                                         LABS:  07-20    137  |  100  |  14  ----------------------------<  141<H>  3.7   |  24  |  0.62  07-19    138  |  98  |  15  ----------------------------<  105<H>  4.0   |  27  |  0.75    Ca    9.0      20 Jul 2019 06:00  Ca    10.1      19 Jul 2019 14:31  Phos  2.9     07-19  Mg     1.6     07-20    TPro  6.7  /  Alb  3.5  /  TBili  1.5<H>  /  DBili  x   /  AST  25  /  ALT  13  /  AlkPhos  45  07-20  TPro  7.9  /  Alb  4.3  /  TBili  2.3<H>  /  DBili  x   /  AST  28  /  ALT  16  /  AlkPhos  51  07-19      PT/INR - ( 20 Jul 2019 06:00 )   PT: 14.1 SEC;   INR: 1.26          PTT - ( 20 Jul 2019 06:00 )  PTT:31.9 SEC                           13.6   2.71  )-----------( 88       ( 20 Jul 2019 06:00 )             43.0                         14.7   3.37  )-----------( 110      ( 19 Jul 2019 14:31 )             45.7     CAPILLARY BLOOD GLUCOSE      POCT Blood Glucose.: 123 mg/dL (20 Jul 2019 21:50)  POCT Blood Glucose.: 128 mg/dL (20 Jul 2019 17:42)  POCT Blood Glucose.: 156 mg/dL (20 Jul 2019 12:30)  POCT Blood Glucose.: 171 mg/dL (20 Jul 2019 08:17)        RECENT CULTURES:  07-19 @ 15:30  BLOOD PERIPHERAL    NO ORGANISMS ISOLATED  NO ORGANISMS ISOLATED AT 24 HOURS

## 2019-07-21 NOTE — PROGRESS NOTE ADULT - PROBLEM SELECTOR PLAN 1
febrile, hemodynamically stable and with improved cough and resolved SOB. Saturating at 98% on RA with noted positive Parafluenza 3 on RVP   - cont with duo nebs and convert to MDI  - c/w prednisone 40mg daily  - Will monitor off Abx as illness likely due to viral etiology  - F/u Blood cultures  - If acutely worsens, consider bacterial infection and restart Ceftriaxone and Doxycycline for CAP coverage

## 2019-07-21 NOTE — PROGRESS NOTE ADULT - PROBLEM SELECTOR PLAN 2
Secondary to parainfluenza virus  - Will monitor off Abx as illness likely due to viral etiology  - Will give Duonebs convert to MDI in one day  - Prednisone 40mg for possible mild asthma exacerbation  - F/u Blood cultures  - If acutely worsens will restart Ceftriaxone and Doxycycline for CAP coverage

## 2019-07-21 NOTE — PROGRESS NOTE ADULT - ASSESSMENT
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

## 2019-07-22 ENCOUNTER — TRANSCRIPTION ENCOUNTER (OUTPATIENT)
Age: 84
End: 2019-07-22

## 2019-07-22 LAB
ALBUMIN SERPL ELPH-MCNC: 3.4 G/DL — SIGNIFICANT CHANGE UP (ref 3.3–5)
ALP SERPL-CCNC: 36 U/L — LOW (ref 40–120)
ALT FLD-CCNC: 20 U/L — SIGNIFICANT CHANGE UP (ref 4–33)
ANION GAP SERPL CALC-SCNC: 13 MMO/L — SIGNIFICANT CHANGE UP (ref 7–14)
APTT BLD: 33.5 SEC — SIGNIFICANT CHANGE UP (ref 27.5–36.3)
AST SERPL-CCNC: 29 U/L — SIGNIFICANT CHANGE UP (ref 4–32)
BASOPHILS # BLD AUTO: 0.01 K/UL — SIGNIFICANT CHANGE UP (ref 0–0.2)
BASOPHILS NFR BLD AUTO: 0.2 % — SIGNIFICANT CHANGE UP (ref 0–2)
BILIRUB SERPL-MCNC: 0.8 MG/DL — SIGNIFICANT CHANGE UP (ref 0.2–1.2)
BUN SERPL-MCNC: 31 MG/DL — HIGH (ref 7–23)
CALCIUM SERPL-MCNC: 9.5 MG/DL — SIGNIFICANT CHANGE UP (ref 8.4–10.5)
CHLORIDE SERPL-SCNC: 102 MMOL/L — SIGNIFICANT CHANGE UP (ref 98–107)
CO2 SERPL-SCNC: 26 MMOL/L — SIGNIFICANT CHANGE UP (ref 22–31)
CREAT SERPL-MCNC: 0.85 MG/DL — SIGNIFICANT CHANGE UP (ref 0.5–1.3)
EOSINOPHIL # BLD AUTO: 0.02 K/UL — SIGNIFICANT CHANGE UP (ref 0–0.5)
EOSINOPHIL NFR BLD AUTO: 0.5 % — SIGNIFICANT CHANGE UP (ref 0–6)
GLUCOSE SERPL-MCNC: 106 MG/DL — HIGH (ref 70–99)
HCT VFR BLD CALC: 38.3 % — SIGNIFICANT CHANGE UP (ref 34.5–45)
HGB BLD-MCNC: 12.2 G/DL — SIGNIFICANT CHANGE UP (ref 11.5–15.5)
IMM GRANULOCYTES NFR BLD AUTO: 1.2 % — SIGNIFICANT CHANGE UP (ref 0–1.5)
INR BLD: 1.56 — HIGH (ref 0.88–1.17)
LYMPHOCYTES # BLD AUTO: 1.49 K/UL — SIGNIFICANT CHANGE UP (ref 1–3.3)
LYMPHOCYTES # BLD AUTO: 35.1 % — SIGNIFICANT CHANGE UP (ref 13–44)
MAGNESIUM SERPL-MCNC: 1.7 MG/DL — SIGNIFICANT CHANGE UP (ref 1.6–2.6)
MCHC RBC-ENTMCNC: 28.4 PG — SIGNIFICANT CHANGE UP (ref 27–34)
MCHC RBC-ENTMCNC: 31.9 % — LOW (ref 32–36)
MCV RBC AUTO: 89.1 FL — SIGNIFICANT CHANGE UP (ref 80–100)
MONOCYTES # BLD AUTO: 0.48 K/UL — SIGNIFICANT CHANGE UP (ref 0–0.9)
MONOCYTES NFR BLD AUTO: 11.3 % — SIGNIFICANT CHANGE UP (ref 2–14)
NEUTROPHILS # BLD AUTO: 2.2 K/UL — SIGNIFICANT CHANGE UP (ref 1.8–7.4)
NEUTROPHILS NFR BLD AUTO: 51.7 % — SIGNIFICANT CHANGE UP (ref 43–77)
NRBC # FLD: 0 K/UL — SIGNIFICANT CHANGE UP (ref 0–0)
PHOSPHATE SERPL-MCNC: 3 MG/DL — SIGNIFICANT CHANGE UP (ref 2.5–4.5)
PLATELET # BLD AUTO: 96 K/UL — LOW (ref 150–400)
PMV BLD: 11.8 FL — SIGNIFICANT CHANGE UP (ref 7–13)
POTASSIUM SERPL-MCNC: 3.1 MMOL/L — LOW (ref 3.5–5.3)
POTASSIUM SERPL-SCNC: 3.1 MMOL/L — LOW (ref 3.5–5.3)
PROT SERPL-MCNC: 6 G/DL — SIGNIFICANT CHANGE UP (ref 6–8.3)
PROTHROM AB SERPL-ACNC: 17.5 SEC — HIGH (ref 9.8–13.1)
RBC # BLD: 4.3 M/UL — SIGNIFICANT CHANGE UP (ref 3.8–5.2)
RBC # FLD: 14.3 % — SIGNIFICANT CHANGE UP (ref 10.3–14.5)
SODIUM SERPL-SCNC: 141 MMOL/L — SIGNIFICANT CHANGE UP (ref 135–145)
WBC # BLD: 4.25 K/UL — SIGNIFICANT CHANGE UP (ref 3.8–10.5)
WBC # FLD AUTO: 4.25 K/UL — SIGNIFICANT CHANGE UP (ref 3.8–10.5)

## 2019-07-22 PROCEDURE — 99239 HOSP IP/OBS DSCHRG MGMT >30: CPT

## 2019-07-22 RX ORDER — APIXABAN 2.5 MG/1
2 TABLET, FILM COATED ORAL
Qty: 60 | Refills: 0
Start: 2019-07-22 | End: 2019-08-05

## 2019-07-22 RX ORDER — ALBUTEROL 90 UG/1
1 AEROSOL, METERED ORAL
Qty: 1 | Refills: 0
Start: 2019-07-22 | End: 2019-08-20

## 2019-07-22 RX ADMIN — Medication 50 MILLIGRAM(S): at 07:07

## 2019-07-22 RX ADMIN — Medication 40 MILLIGRAM(S): at 07:07

## 2019-07-22 RX ADMIN — ENOXAPARIN SODIUM 70 MILLIGRAM(S): 100 INJECTION SUBCUTANEOUS at 17:24

## 2019-07-22 RX ADMIN — ENOXAPARIN SODIUM 70 MILLIGRAM(S): 100 INJECTION SUBCUTANEOUS at 07:07

## 2019-07-22 RX ADMIN — AMLODIPINE BESYLATE 5 MILLIGRAM(S): 2.5 TABLET ORAL at 07:07

## 2019-07-22 RX ADMIN — Medication 3 MILLILITER(S): at 04:59

## 2019-07-22 RX ADMIN — Medication 3 MILLILITER(S): at 16:08

## 2019-07-22 RX ADMIN — PREGABALIN 1000 MICROGRAM(S): 225 CAPSULE ORAL at 13:17

## 2019-07-22 RX ADMIN — WARFARIN SODIUM 5 MILLIGRAM(S): 2.5 TABLET ORAL at 17:24

## 2019-07-22 RX ADMIN — LOSARTAN POTASSIUM 25 MILLIGRAM(S): 100 TABLET, FILM COATED ORAL at 07:07

## 2019-07-22 RX ADMIN — Medication 3 MILLILITER(S): at 10:07

## 2019-07-22 RX ADMIN — Medication 50 MILLIGRAM(S): at 17:24

## 2019-07-22 NOTE — DISCHARGE NOTE NURSING/CASE MANAGEMENT/SOCIAL WORK - NSDCDPATPORTLINK_GEN_ALL_CORE
You can access the [facility] Patient Portal, offered by [facility], by registering with the following website: [email or web address]

## 2019-07-22 NOTE — DISCHARGE NOTE PROVIDER - NSDCFUADDAPPT_GEN_ALL_CORE_FT
Please follow up with your PCP for Please follow up with your PCP for INR level check this week. We called your PCP multple times and left messages requiring an appointment. Please reach out to the office if you don't hear back from them tomorrow. Please follow up with your PCP for INR level check this week. We called your PCP multiple times and left messages requiring an appointment. Please reach out to the office if you don't hear back from them tomorrow. Please follow up with your PCP, Dr. Quintana, on Thursday 7/25/2019 at 10AM for INR level follow up and further anticoagulation management.     Appointment: 7/25/2019  Time: 10:00 AM  PCP: Natasha Quintana  Phone: [phone]

## 2019-07-22 NOTE — PROGRESS NOTE ADULT - ATTENDING COMMENTS
84F presented with viral bronchitis secondary to parainfluenza. Respiratory status is now improved with treatment of nebulizer and steroid. She was on Warfarin for Afib/DVT with subtherapeutic INR on presentation. Patient reporting h/o of difficulty with Warfarin and currently is compliant with her regimen. She is awaiting discussion between her PCP (Dr. Quintana) and her Cardiologist (Dr. Joe Hdez) regarding changing her to a DOAC. I recommended switching to Eliquis, and patient would like input from her PCP and Cardiologist for this change, which could not be reached over the weekend. will c/w Lovenox for now. dc warfarin, as patient would benefit from transitioning to DOAC upon discharge.
Patient seen and examined.  Case discussed with house staff.  Agree with above as edited.  Patient is a 84yoF with h/o DM type II, HTN, AFib, HLD admitted with sepsis with acute asthma exacerbation secondary to viral URI secondary to parainfluenza infection. Sympomatically improving.  Afib - rate control with metoprolol. On Coumadin for AC. d/w patient DOACs -sending test Rx for pricing.  d/c home today with outpatient f/u  D/C time: 40 minutes
88F with viral bronchitis and asthma exacerbation. hypoxic overnight improved with nebulizer treatment and prednisone. no further fever. holding abx. f/u cultures. supportive treatment for respiratory symptoms. INR subtherapeutic, possible medication non-compliance. will start full dose lovenox for AC given high CHADSVASc. consider switching to DOAC given dietary non-compliance with low Vit K diet. PT ana.

## 2019-07-22 NOTE — PROGRESS NOTE ADULT - ASSESSMENT
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 Patient feels back to baseline, with improved cough and no SOB. INR still under therapeutic, on warfarin 5mg and lovenox 70mg until AC regimen is adjusted.

## 2019-07-22 NOTE — DISCHARGE NOTE PROVIDER - HOSPITAL COURSE
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 patient was found to be tachycardic, tachypneic and febrile meeting sepsis criteria and with diffuse expiratory wheeze and rhonchi with an RVP positive for Parainfluenza and a chest xray that did not show pneumonia. The patient was treated with  4 days of steroids and duonebs and was observed off Abx. the patient improved greatly and had negative blood cultures. because of weakness and unsteadiness that  patient was evaluated by physical therapy and did ot need any home services. It was unclear why the patient was on coumadin but patient renal function was normal and patient open to taking Eliquis. We reached out to patient's primary care answering service and was setup with an appointment upon discharge. 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 patient was found to be tachycardic, tachypneic and febrile meeting sepsis criteria and with diffuse expiratory wheeze and rhonchi with an RVP positive for Parainfluenza and a chest xray that did not show pneumonia. The patient was treated with  4 days of steroids and duonebs and was observed off Abx. the patient improved greatly and had negative blood cultures. because of weakness and unsteadiness that  patient was evaluated by physical therapy and did ot need any home services. It was unclear why the patient was on coumadin but patient renal function was normal and patient open to taking Eliquis. We reached out to patient's primary care answering service and left 3 messages at PCP office requiring outpatient appointment for INR level follow up. 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 patient was found to be tachycardic, tachypneic and febrile meeting sepsis criteria and with diffuse expiratory wheeze and rhonchi with an RVP positive for Parainfluenza and a chest xray that did not show pneumonia. The patient was treated with  4 days of steroids and duonebs and was observed off Abx. the patient improved greatly and had negative blood cultures. because of weakness and unsteadiness that  patient was evaluated by physical therapy and did ot need any home services. It was unclear why the patient was on coumadin but patient renal function was normal and patient open to taking Eliquis, however unable to start due to lack of insurance coverage. Patient understands that  she needs to continue to take warfarin. She will be discharged with warfarin 6mg daily. Patient verbalized understanding and reported back she will follow up with her PCP for further management including INR level check this week. Spoke with patient's PCP, who is aware patient is being discharged home today on warfarin 6mg. Patient has an upcoming appointment with PCP this Thursday 7/25/2019 at 10AM for INR follow up. Patient was also educated on foods to avoid while on warfarin.

## 2019-07-22 NOTE — PROGRESS NOTE ADULT - PROBLEM SELECTOR PLAN 1
febrile, hemodynamically stable and with improved cough and resolved SOB. Saturating at 98% on RA with noted positive Parafluenza 3 on RVP   - cont with duo nebs   - c/w prednisone 40mg daily  - If acutely worsens, consider bacterial infection and restart Ceftriaxone and Doxycycline for CAP coverage

## 2019-07-22 NOTE — DISCHARGE NOTE NURSING/CASE MANAGEMENT/SOCIAL WORK - NSDCFUADDAPPT_GEN_ALL_CORE_FT
Please follow up with your PCP, Dr. Quintana, on Thursday 7/25/2019 at 10AM for INR level follow up and further anticoagulation management.     Appointment: 7/25/2019  Time: 10:00 AM  PCP: Natasha Quintana  Phone: [phone]

## 2019-07-22 NOTE — PROGRESS NOTE ADULT - PROBLEM SELECTOR PLAN 3
Secondary to parainfluenza virus. Patient remains afebrile and normotensive, WBC wnl  - cont to monitor off Abx   - c/w Duonebs   - c/w Prednisone 40mg   - If acutely worsens will restart Ceftriaxone and Doxycycline for CAP coverage

## 2019-07-22 NOTE — DISCHARGE NOTE PROVIDER - INSTRUCTIONS
You should limit your self to 2000mg of sodium daily. By limiting your sodium intake this will help control your hypertension and/or congestive heart failure. You can limit the amount you intake by avoiding adding extra salt to your foods, avoiding eating pre-made frozen meals, and avoid eating canned foods. These food products contain high quantities of preservatives including sodium. It is suggested that you keep a food diary for one month and record the amount of sodium with each meal and snack and bring this to your doctors office for further discussion. For more information on sodium consumption you can visit the American Heart Associations Website at: [email or web address].

## 2019-07-22 NOTE — PROGRESS NOTE ADULT - PROBLEM SELECTOR PLAN 5
INR 1.56; currently rate-controlled  - c/w metropolol 50mg BID  - c/w warfarin 5mg for now  - f/u with PCP re AC regimen  - d/c home with increase dose of warfarin vs NOAC pending pharmacy approval

## 2019-07-22 NOTE — PROGRESS NOTE ADULT - SUBJECTIVE AND OBJECTIVE BOX
Kiki Husain PGY1  Pager: [phone]      Chief Complaint: Patient is a 88y old  Female who presents with a chief complaint of Shortness of breath and cough x 3 days         Subjective: No acute events o/n. This AM patient was saturating to 98% on RA, speaking comfortably in full sentences. Reports feeling a lot better and with improved cough. Good appetite. She denies any sob, chest pain, headaches, dizziness, abd pain, n/v, or diarrhea.         VITAL SIGNS:  Vital Signs Last 24 Hrs  T(C): 36.7 (22 Jul 2019 07:03), Max: 36.8 (21 Jul 2019 21:42)  T(F): 98 (22 Jul 2019 07:03), Max: 98.2 (21 Jul 2019 21:42)  HR: 70 (22 Jul 2019 10:07) (61 - 92)  BP: 126/60 (22 Jul 2019 07:03) (124/80 - 138/93)  BP(mean): --  RR: 17 (22 Jul 2019 07:03) (17 - 18)  SpO2: 98% (22 Jul 2019 10:07) (94% - 100%)      PHYSICAL EXAM:     GENERAL: no acute distress  HEENT: PERRLA, EOMI, moist oropharynx   RESPIRATORY: CTAB, no w/c   CARDIOVASCULAR: RRR, no murmurs, gallops, rubs  ABDOMINAL: soft, non-tender, non-distended, positive bowel sounds   EXTREMITIES: no clubbing, cyanosis, or edema  NEUROLOGICAL: alert and oriented x 3, non-focal  SKIN: no rashes or lesions   MUSCULOSKELETAL: no gross joint deformity                          12.2   4.25  )-----------( 96       ( 22 Jul 2019 07:11 )             38.3     07-22    141  |  102  |  31<H>  ----------------------------<  106<H>  3.1<L>   |  26  |  0.85    Ca    9.5      22 Jul 2019 07:00  Phos  3.0     07-22  Mg     1.7     07-22    TPro  6.0  /  Alb  3.4  /  TBili  0.8  /  DBili  x   /  AST  29  /  ALT  20  /  AlkPhos  36<L>  07-22      CAPILLARY BLOOD GLUCOSE      POCT Blood Glucose.: 116 mg/dL (22 Jul 2019 08:52)  POCT Blood Glucose.: 115 mg/dL (21 Jul 2019 23:18)  POCT Blood Glucose.: 161 mg/dL (21 Jul 2019 17:34)  POCT Blood Glucose.: 134 mg/dL (21 Jul 2019 12:20)      MEDICATIONS  (STANDING):  ALBUTerol    90 MICROgram(s) HFA Inhaler 1 Puff(s) Inhalation every 4 hours  ALBUTerol/ipratropium for Nebulization 3 milliLiter(s) Nebulizer every 6 hours  amLODIPine   Tablet 5 milliGRAM(s) Oral daily  atorvastatin 40 milliGRAM(s) Oral at bedtime  cyanocobalamin 1000 MICROGram(s) Oral daily  dextrose 5%. 1000 milliLiter(s) (50 mL/Hr) IV Continuous <Continuous>  dextrose 50% Injectable 12.5 Gram(s) IV Push once  dextrose 50% Injectable 25 Gram(s) IV Push once  dextrose 50% Injectable 25 Gram(s) IV Push once  enoxaparin Injectable 70 milliGRAM(s) SubCutaneous two times a day  ergocalciferol 58669 Unit(s) Oral every week  furosemide    Tablet 40 milliGRAM(s) Oral daily  insulin lispro (HumaLOG) corrective regimen sliding scale   SubCutaneous three times a day before meals  insulin lispro (HumaLOG) corrective regimen sliding scale   SubCutaneous at bedtime  losartan 25 milliGRAM(s) Oral daily  metoprolol tartrate 50 milliGRAM(s) Oral two times a day  predniSONE   Tablet 40 milliGRAM(s) Oral daily  tiotropium 18 MICROgram(s) Capsule 1 Capsule(s) Inhalation daily  warfarin 5 milliGRAM(s) Oral daily    MEDICATIONS  (PRN):  dextrose 40% Gel 15 Gram(s) Oral once PRN Blood Glucose LESS THAN 70 milliGRAM(s)/deciliter  glucagon  Injectable 1 milliGRAM(s) IntraMuscular once PRN Glucose LESS THAN 70 milligrams/deciliter

## 2019-07-22 NOTE — DISCHARGE NOTE PROVIDER - PROVIDER TOKENS
FREE:[LAST:[Lilian],FIRST:[Natasha],PHONE:[phone]],FAX:[(   )    -],ADDRESS:[Primary Care Doctor]]

## 2019-07-22 NOTE — DISCHARGE NOTE PROVIDER - NSDCCPCAREPLAN_GEN_ALL_CORE_FT
PRINCIPAL DISCHARGE DIAGNOSIS  Diagnosis: Parainfluenza  Assessment and Plan of Treatment: You were seen at [facility] for shortness of breath and fatigue. You were found to likely have an asthma exaccerbation due to this virus called Parainfluenza. You were given steroids and inhaler treatments and you improved greatly.   You are to continue the steroids for one more day and continue the inhaler treatments every 4 hours as needed for shortness of breath.   You are to follow-up with your Primary Care doctor on.....for further management of your asthma and other chronic medical problems.   You are to follow-up in the next 1-2 weeks with [facility] Division of Pulmonary, Critical Care, nd Sleep Medicine at [facility]. Please call [phone] for an appointment.   You should return to the hospital if you begin to develop worsening shortness of breath/difficulty breathing at rest despite continued treatment with your prescribed medications, development/persistent fevers for greater than 2 days, worsening cough and other symptoms despite your prescribed medications, or for any other concerns with your breathing or any other problems.  For more information on Asthma please visit the American College of Chest Physician's Website at:  [email or web address]-[email or web address]      SECONDARY DISCHARGE DIAGNOSES  Diagnosis: Atrial fibrillation  Assessment and Plan of Treatment: While at [facility] you were seen and found to have a subtherapeutic INR on your 6mg of Coumadin. (INR 1.12). We switched you to Eliquis for your Atrial fibrilation because your kidney function was normal.   You are to take the Eliquis (blood thinner) as prescribed in the paperwork.   You are to follow-up with your Primary Care doctor on.....for further management of your asthma and other chronic medical problems.   You should return to the hospital if you begin to have any bright red blood in your stools, black tarry stools, nausea with vomiting of blood or black coffee ground like material, or for any other concerns.       Diagnosis: Asthma  Assessment and Plan of Treatment: You were seen at [facility] for shortness of breath and fatigue. You were found to likely have an asthma exaccerbation due to this virus called Parainfluenza. You were given steroids and inhaler treatments and you improved greatly.   You are to continue the steroids for one more day and continue the inhaler treatments every 4 hours as needed for shortness of breath.   You are to follow-up with your Primary Care doctor on.....for further management of your asthma and other chronic medical problems.   You are to follow-up in the next 1-2 weeks with [facility] Division of Pulmonary, Critical Care, nd Sleep Medicine at [facility]. Please call [phone] for an appointment.   You should return to the hospital if you begin to develop worsening shortness of breath/difficulty breathing at rest despite continued treatment with your prescribed medications, development/persistent fevers for greater than 2 days, worsening cough and other symptoms despite your prescribed medications, or for any other concerns with your breathing or any other problems.  For more information on Asthma please visit the American College of Chest Physician's Website at:  [email or web address]-[email or web address]    Diagnosis: HTN (Hypertension)  Assessment and Plan of Treatment: While at [facility] you had no active issues with your blodo pressure.   You are to continue the medications as prescribed and are to continue all your home meds that you came in taking with no addition of any medications.   You should return to the hospital if your blood pressure is elevated >160/100 AND with symptoms such as shortness of breath with difficulty breathing, chest pain, blood in your urine, change in mental status, severe sudden onset headache, one sided paralysis or slurred speech, or any other concerns. PRINCIPAL DISCHARGE DIAGNOSIS  Diagnosis: Parainfluenza  Assessment and Plan of Treatment: [facility]. You were found to likely have an asthma exaccerbation due to this virus called Parainfluenza. You were given steroids and inhaler treatments and you improved greatly.   You are to continue the steroids for one more day and continue the inhaler treatments every 4 hours as needed for shortness of breath.   You are to follow-up with your Primary Care doctor on.....for further management of your asthma and other chronic medical problems.   You are to follow-up in the next 1-2 weeks with [facility] Division of Pulmonary, Critical Care, nd Sleep Medicine at [facility]. Please call [phone] for an appointment.   You should return to the hospital if you begin to develop worsening shortness of breath/difficulty breathing at rest despite continued treatment with your prescribed medications, development/persistent fevers for greater than 2 days, worsening cough and other symptoms despite your prescribed medications, or for any other concerns with your breathing or any other problems.  For more information on Asthma please visit the American College of Chest Physician's Website at:  [email or web address]-[email or web address]      SECONDARY DISCHARGE DIAGNOSES  Diagnosis: Asthma  Assessment and Plan of Treatment: [facility] of breath and fatigue. You were found to likely have an asthma exaccerbation due to this virus called Parainfluenza. You were given steroids and inhaler treatments and you improved greatly.   You are to continue the steroids (prednisone) for one more day and continue the inhaler treatments every 4 hours as needed for shortness of breath. You are to take 40mg of prednisone on the 7/23/2019. Please STOP taking prednisone after the 7/23/2019.   You are to follow-up with your Primary Care doctor on.....for further management of your asthma and other chronic medical problems.   You are to follow-up in the next 1-2 weeks with [facility] Division of Pulmonary, Critical Care, nd Sleep Medicine at [facility]. Please call [phone] for an appointment.   You should return to the hospital if you begin to develop worsening shortness of breath/difficulty breathing at rest despite continued treatment with your prescribed medications, development/persistent fevers for greater than 2 days, worsening cough and other symptoms despite your prescribed medications, or for any other concerns with your breathing or any other problems.  For more information on Asthma please visit the American College of Chest Physician's Website at:  [email or web address]/Patient-[email or web address]-Resources/[email or web address]    Diagnosis: HTN (Hypertension)  Assessment and Plan of Treatment: While at [facility] you had no active issues with your blodo pressure.   You are to continue the medications as prescribed and are to continue all your home meds that you came in taking with no addition of any medications.   You should return to the hospital if your blood pressure is elevated >160/100 AND with symptoms such as shortness of breath with difficulty breathing, chest pain, blood in your urine, change in mental status, severe sudden onset headache, one sided paralysis or slurred speech, or any other concerns.    Diagnosis: Atrial fibrillation  Assessment and Plan of Treatment: While at [facility] you were seen and found to have a subtherapeutic INR on 5mg of Coumadin. (INR 1.12). You will be sent home on warfarin 6mg daily.   You are to follow-up with your Primary Care doctor this Thursday 7/25/2019 for INR level check up and further management of your anticoagulation medications.   You should return to the hospital if you begin to have any bright red blood in your stools, black tarry stools, nausea with vomiting of blood or black coffee ground like material, or for any other concerns.

## 2019-07-23 VITALS
HEART RATE: 86 BPM | DIASTOLIC BLOOD PRESSURE: 75 MMHG | OXYGEN SATURATION: 94 % | SYSTOLIC BLOOD PRESSURE: 159 MMHG | RESPIRATION RATE: 17 BRPM | TEMPERATURE: 98 F

## 2019-07-23 RX ORDER — WARFARIN SODIUM 2.5 MG/1
1 TABLET ORAL
Qty: 0 | Refills: 0 | DISCHARGE
Start: 2019-07-23 | End: 2019-09-23

## 2019-07-24 LAB
BACTERIA BLD CULT: SIGNIFICANT CHANGE UP
BACTERIA BLD CULT: SIGNIFICANT CHANGE UP

## 2019-07-26 PROBLEM — I34.0 MITRAL REGURGITATION: Status: ACTIVE | Noted: 2017-01-18

## 2019-07-26 PROBLEM — I47.2 NONSUSTAINED VENTRICULAR TACHYCARDIA: Status: ACTIVE | Noted: 2019-02-18

## 2019-07-26 NOTE — DISCUSSION/SUMMARY
[FreeTextEntry1] : In summary, Ms. Melo has recently been ill with symptoms of low grade fever, cough, coryza, generalized fatigue and decreased appetite.  She does not feel prepared or well enough to return home at this point.  We will therefore transport Ms. Melo to the Emergency Department at [facility] for further evaluation.  Although Ms. Melo's history is suggestive of a likely viral upper respiratory tract illness, it would be prudent to ensure that she is not suffering from pneumonia or bacteremia, among other possibilities.  Note that Ms. Melo otherwise appears to be doing well from a cardiac standpoint.

## 2019-07-26 NOTE — ADDENDUM
[FreeTextEntry1] : Ms. Melo was admitted to the medical service at [facility] on July 19th, 2019.  An initial chest x-ray demonstrated clear lung fields with no evidence of pneumonia.  A viral screen was positive for parainfluenza 3 infection.  Blood cultures were negative.  Ms. Melo was treated with IV fluids and was also treated with a prednisone taper for a possible asthma exacerbation in the setting of a viral upper respiratory tract infection.\par \par Ms. Melo's INR was subtherapeutic at 1.26 and she was treated with enoxaparin 70 mg SQ BID until she attained a therapeutic INR.  A complete blood count on 7/20/19 demonstrated a WBC of 2.7K, hemoglobin 13.6 g/dL, hematocrit 43.0%, and platelet count 88K.  A metabolic panel demonstrated a serum potassium of 3.7 mmol/L and a serum creatinine of 0.62 mg/dL.\par \par Ms. Melo felt progressively better and was discharged from the hospital on July 23rd, 2019.
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
## 2019-11-06 NOTE — H&P ADULT - NSHPREVIEWOFSYSTEMS_GEN_ALL_CORE
Denies chills, CP, Abdominal pain, new rashes, new LE edema, new visual changes, confusion, headaches, blood in stools, N/V, dysuria, and hematuria. 0

## 2020-11-23 NOTE — ED ADULT TRIAGE NOTE - RESPIRATORY RATE (BREATHS/MIN)
20 Benzoyl Peroxide Counseling: Patient counseled that medicine may cause skin irritation and bleach clothing.  In the event of skin irritation, the patient was advised to reduce the amount of the drug applied or use it less frequently.   The patient verbalized understanding of the proper use and possible adverse effects of benzoyl peroxide.  All of the patient's questions and concerns were addressed.

## 2023-02-20 NOTE — PATIENT PROFILE ADULT - MEDICATIONS/VISITS
----- Message from CHELSEA Lopez sent at 2/20/2023 10:04 AM EST -----  Please place in recall for repeat colonoscopy in 5 years.  Send letter to patient and PCP.  
5 year recall placed. Care gap updated. Letter mailed to pt and pcp.   
no

## 2023-06-09 NOTE — PATIENT PROFILE ADULT - TRANSPORTATION
Quality 226: Preventive Care And Screening: Tobacco Use: Screening And Cessation Intervention: Patient screened for tobacco use and is an ex/non-smoker Quality 431: Preventive Care And Screening: Unhealthy Alcohol Use - Screening: Patient identified as an unhealthy alcohol user when screened for unhealthy alcohol use using a systematic screening method and received brief counseling Detail Level: Detailed no

## 2023-10-28 NOTE — PROGRESS NOTE ADULT - PROBLEM SELECTOR PROBLEM 7
10/27/23 9680   C-SSRS (Frequent Screen)   2. Have you actually had any thoughts of killing yourself? No  (patient sleeping)   Suicide Evaluation Negative Screen - White     Patient sleeping for C-SSRS assessment. Maintain current plan of care.     Prophylactic measure
